# Patient Record
Sex: FEMALE | Race: WHITE | Employment: PART TIME | URBAN - NONMETROPOLITAN AREA
[De-identification: names, ages, dates, MRNs, and addresses within clinical notes are randomized per-mention and may not be internally consistent; named-entity substitution may affect disease eponyms.]

---

## 2018-03-22 ENCOUNTER — HOSPITAL ENCOUNTER (OUTPATIENT)
Age: 33
Setting detail: SPECIMEN
Discharge: HOME | End: 2018-03-22
Payer: MEDICAID

## 2018-03-22 ENCOUNTER — INITIAL PRENATAL (OUTPATIENT)
Dept: OBGYN | Age: 33
End: 2018-03-22
Payer: MEDICAID

## 2018-03-22 ENCOUNTER — ROUTINE PRENATAL (OUTPATIENT)
Dept: OBGYN | Age: 33
End: 2018-03-22
Payer: MEDICAID

## 2018-03-22 VITALS — DIASTOLIC BLOOD PRESSURE: 72 MMHG | BODY MASS INDEX: 22.71 KG/M2 | SYSTOLIC BLOOD PRESSURE: 100 MMHG | WEIGHT: 145 LBS

## 2018-03-22 DIAGNOSIS — N91.2 AMENORRHEA: ICD-10-CM

## 2018-03-22 DIAGNOSIS — O09.30 LATE PRENATAL CARE: ICD-10-CM

## 2018-03-22 DIAGNOSIS — D53.1 MEGALOBLASTIC ANEMIA DUE TO VITAMIN B12 DEFICIENCY: ICD-10-CM

## 2018-03-22 DIAGNOSIS — Z32.01 POSITIVE PREGNANCY TEST: ICD-10-CM

## 2018-03-22 DIAGNOSIS — Z3A.24 24 WEEKS GESTATION OF PREGNANCY: ICD-10-CM

## 2018-03-22 DIAGNOSIS — Z3A.24 24 WEEKS GESTATION OF PREGNANCY: Primary | ICD-10-CM

## 2018-03-22 DIAGNOSIS — O09.32 INSUFFICIENT PRENATAL CARE IN SECOND TRIMESTER: ICD-10-CM

## 2018-03-22 DIAGNOSIS — Z36.87 UNSURE OF LMP (LAST MENSTRUAL PERIOD) AS REASON FOR ULTRASOUND SCAN: ICD-10-CM

## 2018-03-22 DIAGNOSIS — N91.2 AMENORRHEA: Primary | ICD-10-CM

## 2018-03-22 LAB
ABDOMINAL CIRCUMFERENCE: 19.5 CM
ABO/RH: NORMAL
AMPHETAMINE SCREEN URINE: NEGATIVE
ANTIBODY SCREEN: NEGATIVE
BARBITURATE SCREEN URINE: NEGATIVE
BENZODIAZEPINE SCREEN, URINE: NEGATIVE
BIPARIETAL DIAMETER: 5.75 CM
BUPRENORPHINE URINE: NEGATIVE
CANNABINOID SCREEN URINE: NEGATIVE
COCAINE METABOLITE, URINE: NEGATIVE
ESTIMATED FETAL WEIGHT: 674 GRAMS
FEMORAL DIAMETER: NORMAL CM
HC/AC: 1.12
HEAD CIRCUMFERENCE: 21.9 CM
MDMA URINE: NORMAL
METHADONE SCREEN, URINE: NEGATIVE
METHAMPHETAMINE, URINE: NEGATIVE
OPIATES, URINE: NEGATIVE
OXYCODONE SCREEN URINE: NEGATIVE
PHENCYCLIDINE, URINE: NEGATIVE
PROPOXYPHENE, URINE: NEGATIVE
TEST INFORMATION: NORMAL
TRICYCLIC ANTIDEPRESSANTS, UR: NEGATIVE
TSH SERPL DL<=0.05 MIU/L-ACNC: 1.16 MIU/L (ref 0.3–5)
VITAMIN B-12: <150 PG/ML (ref 211–946)

## 2018-03-22 PROCEDURE — 86901 BLOOD TYPING SEROLOGIC RH(D): CPT

## 2018-03-22 PROCEDURE — 87491 CHLMYD TRACH DNA AMP PROBE: CPT

## 2018-03-22 PROCEDURE — 99213 OFFICE O/P EST LOW 20 MIN: CPT | Performed by: ADVANCED PRACTICE MIDWIFE

## 2018-03-22 PROCEDURE — 87591 N.GONORRHOEAE DNA AMP PROB: CPT

## 2018-03-22 PROCEDURE — 36415 COLL VENOUS BLD VENIPUNCTURE: CPT | Performed by: ADVANCED PRACTICE MIDWIFE

## 2018-03-22 PROCEDURE — 85025 COMPLETE CBC W/AUTO DIFF WBC: CPT

## 2018-03-22 PROCEDURE — 87340 HEPATITIS B SURFACE AG IA: CPT

## 2018-03-22 PROCEDURE — 84443 ASSAY THYROID STIM HORMONE: CPT

## 2018-03-22 PROCEDURE — 86780 TREPONEMA PALLIDUM: CPT

## 2018-03-22 PROCEDURE — 86900 BLOOD TYPING SEROLOGIC ABO: CPT

## 2018-03-22 PROCEDURE — 80306 DRUG TEST PRSMV INSTRMNT: CPT

## 2018-03-22 PROCEDURE — 87086 URINE CULTURE/COLONY COUNT: CPT

## 2018-03-22 PROCEDURE — 82607 VITAMIN B-12: CPT

## 2018-03-22 PROCEDURE — 86762 RUBELLA ANTIBODY: CPT

## 2018-03-22 PROCEDURE — 87389 HIV-1 AG W/HIV-1&-2 AB AG IA: CPT

## 2018-03-22 PROCEDURE — 76805 OB US >/= 14 WKS SNGL FETUS: CPT | Performed by: OBSTETRICS & GYNECOLOGY

## 2018-03-22 PROCEDURE — 86850 RBC ANTIBODY SCREEN: CPT

## 2018-03-22 PROCEDURE — 86803 HEPATITIS C AB TEST: CPT

## 2018-03-22 RX ORDER — MULTIVITAMIN/MULTIMINERAL SUPPLEMENT 3080; 920; 120; 400; 22; 1.84; 3; 20; 10; 1; 12; 200; 29; 25; 2 [IU]/1; [IU]/1; MG/1; [IU]/1; [IU]/1; MG/1; MG/1; MG/1; MG/1; MG/1; UG/1; MG/1; MG/1; MG/1; MG/1
1 TABLET, FILM COATED ORAL DAILY
Qty: 90 TABLET | Refills: 5 | Status: SHIPPED | OUTPATIENT
Start: 2018-03-22

## 2018-03-22 RX ORDER — CYANOCOBALAMIN 1000 UG/ML
1000 INJECTION INTRAMUSCULAR; SUBCUTANEOUS ONCE
Status: ON HOLD | COMMUNITY
End: 2018-07-01 | Stop reason: HOSPADM

## 2018-03-22 ASSESSMENT — PATIENT HEALTH QUESTIONNAIRE - PHQ9
SUM OF ALL RESPONSES TO PHQ QUESTIONS 1-9: 0
SUM OF ALL RESPONSES TO PHQ9 QUESTIONS 1 & 2: 0
2. FEELING DOWN, DEPRESSED OR HOPELESS: 0
1. LITTLE INTEREST OR PLEASURE IN DOING THINGS: 0

## 2018-03-22 NOTE — PROGRESS NOTES
Urine    POCT urine pregnancy   2. 15 weeks gestation of pregnancy  Hepatitis C Antibody    HIV Screen    Prenatal type and screen    PRENATAL PROFILE I    Urine culture clean catch    Urine Drug Screen, Comprehensive    C.trachomatis N.gonorrhoeae DNA, Urine    POCT urine pregnancy       Plan: Order Routine Prenatal Lab Yes     Order additional testing if requested         Order Prenatal Vitamins   Yes          Appointment with Aldo Billings CNM in 1 week for  total body exam if indicated      Nurse:   [facility]

## 2018-03-22 NOTE — PROGRESS NOTES
was positive. Yes PT denies fever, chills, nausea and vomiting       Abdomen: enlarged, soft     See prenatal vital sign section and fetal assessment section    ASSESSMENT & Plan   1. Amenorrhea  Hepatitis C Antibody    HIV Screen    Prenatal type and screen    PRENATAL PROFILE I    Urine culture clean catch    Urine Drug Screen, Comprehensive    C.trachomatis N.gonorrhoeae DNA, Urine   2. Unsure of LMP (last menstrual period) as reason for ultrasound scan     3. Positive pregnancy test  Hepatitis C Antibody    HIV Screen    Prenatal type and screen    PRENATAL PROFILE I    Urine culture clean catch    Urine Drug Screen, Comprehensive    C.trachomatis N.gonorrhoeae DNA, Urine    Prenatal Vit-Fe Fumarate-FA (PRENATAL PLUS/IRON) 27-1 MG TABS tablet   4. 24 weeks gestation of pregnancy  TSH With Reflex Ft4    Vitamin B12   5. Megaloblastic anemia due to vitamin B12 deficiency  TSH With Reflex Ft4    Vitamin B12   6. Insufficient prenatal care in second trimester       24.2 wk IUP   CL- 4 cm   bpm  Anterior gr 2-3 placenta, breech presentation  Ovaries WNL  Gender female  Active fetal Movement  All visualized fetal anatomy WNL  LPNC        I have discontinued Ms. Duran's norgestimate-ethinyl estradiol. I am also having her maintain her cyanocobalamin and PRENATAL PLUS/IRON. Return in about 3 weeks (around 4/12/2018) for OB GTT. There are no Patient Instructions on file for this visit.           Sandy Cespedes,3/22/2018 4:26 PM

## 2018-03-23 LAB
ABSOLUTE EOS #: 0.14 K/UL (ref 0–0.44)
ABSOLUTE IMMATURE GRANULOCYTE: 0.13 K/UL (ref 0–0.3)
ABSOLUTE LYMPH #: 1.44 K/UL (ref 1.1–3.7)
ABSOLUTE MONO #: 0.76 K/UL (ref 0.1–1.2)
BASOPHILS # BLD: 0 % (ref 0–2)
BASOPHILS ABSOLUTE: <0.03 K/UL (ref 0–0.2)
CULTURE: NORMAL
CULTURE: NORMAL
DIFFERENTIAL TYPE: ABNORMAL
EOSINOPHILS RELATIVE PERCENT: 2 % (ref 1–4)
HCT VFR BLD CALC: 27.5 % (ref 36.3–47.1)
HEMOGLOBIN: 8.5 G/DL (ref 11.9–15.1)
HEPATITIS B SURFACE ANTIGEN: NONREACTIVE
HEPATITIS C ANTIBODY: NONREACTIVE
HIV AG/AB: NONREACTIVE
IMMATURE GRANULOCYTES: 2 %
LYMPHOCYTES # BLD: 18 % (ref 24–43)
Lab: NORMAL
Lab: NORMAL
MCH RBC QN AUTO: 26.6 PG (ref 25.2–33.5)
MCHC RBC AUTO-ENTMCNC: 30.9 G/DL (ref 28.4–34.8)
MCV RBC AUTO: 86.2 FL (ref 82.6–102.9)
MONOCYTES # BLD: 9 % (ref 3–12)
NRBC AUTOMATED: 0 PER 100 WBC
PDW BLD-RTO: 14.8 % (ref 11.8–14.4)
PLATELET # BLD: 195 K/UL (ref 138–453)
PLATELET ESTIMATE: ABNORMAL
PMV BLD AUTO: 11.3 FL (ref 8.1–13.5)
RBC # BLD: 3.19 M/UL (ref 3.95–5.11)
RBC # BLD: ABNORMAL 10*6/UL
RUBV IGG SER QL: 88.1 IU/ML
SEG NEUTROPHILS: 70 % (ref 36–65)
SEGMENTED NEUTROPHILS ABSOLUTE COUNT: 5.67 K/UL (ref 1.5–8.1)
SPECIMEN DESCRIPTION: NORMAL
SPECIMEN DESCRIPTION: NORMAL
STATUS: NORMAL
T. PALLIDUM, IGG: NONREACTIVE
WBC # BLD: 8.2 K/UL (ref 3.5–11.3)
WBC # BLD: ABNORMAL 10*3/UL

## 2018-03-26 LAB
C. TRACHOMATIS DNA ,URINE: NEGATIVE
N. GONORRHOEAE DNA, URINE: NEGATIVE

## 2018-04-16 ENCOUNTER — ROUTINE PRENATAL (OUTPATIENT)
Dept: OBGYN | Age: 33
End: 2018-04-16
Payer: MEDICAID

## 2018-04-16 VITALS — BODY MASS INDEX: 23.49 KG/M2 | SYSTOLIC BLOOD PRESSURE: 124 MMHG | WEIGHT: 150 LBS | DIASTOLIC BLOOD PRESSURE: 70 MMHG

## 2018-04-16 DIAGNOSIS — D53.1 MEGALOBLASTIC ANEMIA DUE TO VITAMIN B12 DEFICIENCY: ICD-10-CM

## 2018-04-16 DIAGNOSIS — Z3A.27 27 WEEKS GESTATION OF PREGNANCY: ICD-10-CM

## 2018-04-16 DIAGNOSIS — O09.893 SUPERVISION OF OTHER HIGH RISK PREGNANCIES, THIRD TRIMESTER: Primary | ICD-10-CM

## 2018-04-16 LAB
GLUCOSE BLD-MCNC: 132 MG/DL
HGB, POC: 8.6

## 2018-04-16 PROCEDURE — 99213 OFFICE O/P EST LOW 20 MIN: CPT | Performed by: ADVANCED PRACTICE MIDWIFE

## 2018-04-16 PROCEDURE — 85018 HEMOGLOBIN: CPT | Performed by: ADVANCED PRACTICE MIDWIFE

## 2018-04-16 PROCEDURE — 4004F PT TOBACCO SCREEN RCVD TLK: CPT | Performed by: ADVANCED PRACTICE MIDWIFE

## 2018-04-16 PROCEDURE — 82962 GLUCOSE BLOOD TEST: CPT | Performed by: ADVANCED PRACTICE MIDWIFE

## 2018-04-16 PROCEDURE — G8427 DOCREV CUR MEDS BY ELIG CLIN: HCPCS | Performed by: ADVANCED PRACTICE MIDWIFE

## 2018-04-16 PROCEDURE — G8420 CALC BMI NORM PARAMETERS: HCPCS | Performed by: ADVANCED PRACTICE MIDWIFE

## 2018-05-10 ENCOUNTER — ROUTINE PRENATAL (OUTPATIENT)
Dept: OBGYN | Age: 33
End: 2018-05-10
Payer: MEDICAID

## 2018-05-10 VITALS — SYSTOLIC BLOOD PRESSURE: 122 MMHG | DIASTOLIC BLOOD PRESSURE: 62 MMHG | BODY MASS INDEX: 23.84 KG/M2 | WEIGHT: 152.2 LBS

## 2018-05-10 DIAGNOSIS — Z3A.31 31 WEEKS GESTATION OF PREGNANCY: Primary | ICD-10-CM

## 2018-05-10 DIAGNOSIS — D53.1 MEGALOBLASTIC ANEMIA DUE TO VITAMIN B12 DEFICIENCY: ICD-10-CM

## 2018-05-10 DIAGNOSIS — O09.893 SUPERVISION OF OTHER HIGH RISK PREGNANCIES, THIRD TRIMESTER: ICD-10-CM

## 2018-05-10 DIAGNOSIS — O09.30 LATE PRENATAL CARE: ICD-10-CM

## 2018-05-10 PROCEDURE — 99212 OFFICE O/P EST SF 10 MIN: CPT | Performed by: ADVANCED PRACTICE MIDWIFE

## 2018-05-23 ENCOUNTER — ROUTINE PRENATAL (OUTPATIENT)
Dept: OBGYN | Age: 33
End: 2018-05-23
Payer: MEDICAID

## 2018-05-23 VITALS — DIASTOLIC BLOOD PRESSURE: 76 MMHG | WEIGHT: 152.6 LBS | BODY MASS INDEX: 23.9 KG/M2 | SYSTOLIC BLOOD PRESSURE: 116 MMHG

## 2018-05-23 DIAGNOSIS — O09.893 SUPERVISION OF OTHER HIGH RISK PREGNANCIES, THIRD TRIMESTER: Primary | ICD-10-CM

## 2018-05-23 DIAGNOSIS — D53.1 MEGALOBLASTIC ANEMIA DUE TO VITAMIN B12 DEFICIENCY: ICD-10-CM

## 2018-05-23 DIAGNOSIS — Z3A.33 33 WEEKS GESTATION OF PREGNANCY: ICD-10-CM

## 2018-05-23 LAB
ABDOMINAL CIRCUMFERENCE: 29.15 CM
BIPARIETAL DIAMETER: 8.15 CM
ESTIMATED FETAL WEIGHT: 2138 GRAMS
FEMORAL DIAMETER: NORMAL CM
HC/AC: 1.07
HEAD CIRCUMFERENCE: 31.12 CM

## 2018-05-23 PROCEDURE — 99212 OFFICE O/P EST SF 10 MIN: CPT | Performed by: OBSTETRICS & GYNECOLOGY

## 2018-05-23 PROCEDURE — 76816 OB US FOLLOW-UP PER FETUS: CPT | Performed by: OBSTETRICS & GYNECOLOGY

## 2018-05-29 ENCOUNTER — ROUTINE PRENATAL (OUTPATIENT)
Dept: OBGYN | Age: 33
End: 2018-05-29
Payer: MEDICAID

## 2018-05-29 VITALS — DIASTOLIC BLOOD PRESSURE: 70 MMHG | SYSTOLIC BLOOD PRESSURE: 118 MMHG | BODY MASS INDEX: 24.28 KG/M2 | WEIGHT: 155 LBS

## 2018-05-29 DIAGNOSIS — O09.30 LATE PRENATAL CARE: ICD-10-CM

## 2018-05-29 DIAGNOSIS — D53.1 MEGALOBLASTIC ANEMIA DUE TO VITAMIN B12 DEFICIENCY: Primary | ICD-10-CM

## 2018-05-29 DIAGNOSIS — Z3A.34 34 WEEKS GESTATION OF PREGNANCY: ICD-10-CM

## 2018-05-29 PROCEDURE — 99213 OFFICE O/P EST LOW 20 MIN: CPT | Performed by: ADVANCED PRACTICE MIDWIFE

## 2018-05-29 PROCEDURE — G8427 DOCREV CUR MEDS BY ELIG CLIN: HCPCS | Performed by: ADVANCED PRACTICE MIDWIFE

## 2018-05-29 PROCEDURE — 59025 FETAL NON-STRESS TEST: CPT | Performed by: ADVANCED PRACTICE MIDWIFE

## 2018-05-29 PROCEDURE — G8420 CALC BMI NORM PARAMETERS: HCPCS | Performed by: ADVANCED PRACTICE MIDWIFE

## 2018-05-29 PROCEDURE — 4004F PT TOBACCO SCREEN RCVD TLK: CPT | Performed by: ADVANCED PRACTICE MIDWIFE

## 2018-06-05 ENCOUNTER — ROUTINE PRENATAL (OUTPATIENT)
Dept: OBGYN | Age: 33
End: 2018-06-05
Payer: MEDICAID

## 2018-06-05 VITALS — DIASTOLIC BLOOD PRESSURE: 64 MMHG | BODY MASS INDEX: 24.53 KG/M2 | SYSTOLIC BLOOD PRESSURE: 114 MMHG | WEIGHT: 156.6 LBS

## 2018-06-05 DIAGNOSIS — O09.30 LATE PRENATAL CARE: ICD-10-CM

## 2018-06-05 DIAGNOSIS — D53.1 MEGALOBLASTIC ANEMIA DUE TO VITAMIN B12 DEFICIENCY: Primary | ICD-10-CM

## 2018-06-05 DIAGNOSIS — Z3A.35 35 WEEKS GESTATION OF PREGNANCY: ICD-10-CM

## 2018-06-05 PROCEDURE — 99212 OFFICE O/P EST SF 10 MIN: CPT | Performed by: ADVANCED PRACTICE MIDWIFE

## 2018-06-05 PROCEDURE — 76819 FETAL BIOPHYS PROFIL W/O NST: CPT | Performed by: OBSTETRICS & GYNECOLOGY

## 2018-06-11 ENCOUNTER — HOSPITAL ENCOUNTER (OUTPATIENT)
Age: 33
Setting detail: SPECIMEN
Discharge: HOME | End: 2018-06-11
Payer: MEDICAID

## 2018-06-11 ENCOUNTER — ROUTINE PRENATAL (OUTPATIENT)
Dept: OBGYN | Age: 33
End: 2018-06-11
Payer: MEDICAID

## 2018-06-11 VITALS — DIASTOLIC BLOOD PRESSURE: 82 MMHG | SYSTOLIC BLOOD PRESSURE: 130 MMHG | BODY MASS INDEX: 24.06 KG/M2 | WEIGHT: 153.6 LBS

## 2018-06-11 DIAGNOSIS — D53.1 MEGALOBLASTIC ANEMIA DUE TO VITAMIN B12 DEFICIENCY: Primary | ICD-10-CM

## 2018-06-11 DIAGNOSIS — Z3A.35 35 WEEKS GESTATION OF PREGNANCY: ICD-10-CM

## 2018-06-11 DIAGNOSIS — O09.30 LATE PRENATAL CARE: ICD-10-CM

## 2018-06-11 PROCEDURE — G8427 DOCREV CUR MEDS BY ELIG CLIN: HCPCS | Performed by: ADVANCED PRACTICE MIDWIFE

## 2018-06-11 PROCEDURE — 87081 CULTURE SCREEN ONLY: CPT

## 2018-06-11 PROCEDURE — 76819 FETAL BIOPHYS PROFIL W/O NST: CPT | Performed by: OBSTETRICS & GYNECOLOGY

## 2018-06-11 PROCEDURE — G8420 CALC BMI NORM PARAMETERS: HCPCS | Performed by: ADVANCED PRACTICE MIDWIFE

## 2018-06-11 PROCEDURE — 4004F PT TOBACCO SCREEN RCVD TLK: CPT | Performed by: ADVANCED PRACTICE MIDWIFE

## 2018-06-11 PROCEDURE — 99213 OFFICE O/P EST LOW 20 MIN: CPT | Performed by: ADVANCED PRACTICE MIDWIFE

## 2018-06-14 ENCOUNTER — ROUTINE PRENATAL (OUTPATIENT)
Dept: OBGYN | Age: 33
End: 2018-06-14
Payer: MEDICAID

## 2018-06-14 VITALS — SYSTOLIC BLOOD PRESSURE: 116 MMHG | DIASTOLIC BLOOD PRESSURE: 70 MMHG | WEIGHT: 154.2 LBS | BODY MASS INDEX: 24.15 KG/M2

## 2018-06-14 DIAGNOSIS — Z3A.36 36 WEEKS GESTATION OF PREGNANCY: Primary | ICD-10-CM

## 2018-06-14 DIAGNOSIS — D53.1 MEGALOBLASTIC ANEMIA DUE TO VITAMIN B12 DEFICIENCY: ICD-10-CM

## 2018-06-14 PROCEDURE — 99213 OFFICE O/P EST LOW 20 MIN: CPT | Performed by: ADVANCED PRACTICE MIDWIFE

## 2018-06-14 PROCEDURE — G8420 CALC BMI NORM PARAMETERS: HCPCS | Performed by: ADVANCED PRACTICE MIDWIFE

## 2018-06-14 PROCEDURE — 59025 FETAL NON-STRESS TEST: CPT | Performed by: ADVANCED PRACTICE MIDWIFE

## 2018-06-14 PROCEDURE — 4004F PT TOBACCO SCREEN RCVD TLK: CPT | Performed by: ADVANCED PRACTICE MIDWIFE

## 2018-06-14 PROCEDURE — G8427 DOCREV CUR MEDS BY ELIG CLIN: HCPCS | Performed by: ADVANCED PRACTICE MIDWIFE

## 2018-06-15 LAB
CULTURE: NORMAL
CULTURE: NORMAL
Lab: NORMAL
SPECIMEN DESCRIPTION: NORMAL
SPECIMEN DESCRIPTION: NORMAL
STATUS: NORMAL

## 2018-06-18 ENCOUNTER — ROUTINE PRENATAL (OUTPATIENT)
Dept: OBGYN | Age: 33
End: 2018-06-18
Payer: MEDICAID

## 2018-06-18 VITALS — SYSTOLIC BLOOD PRESSURE: 104 MMHG | BODY MASS INDEX: 24.68 KG/M2 | DIASTOLIC BLOOD PRESSURE: 60 MMHG | WEIGHT: 157.6 LBS

## 2018-06-18 DIAGNOSIS — O09.893 SUPERVISION OF OTHER HIGH RISK PREGNANCIES, THIRD TRIMESTER: ICD-10-CM

## 2018-06-18 DIAGNOSIS — Z3A.36 36 WEEKS GESTATION OF PREGNANCY: ICD-10-CM

## 2018-06-18 DIAGNOSIS — D53.1 MEGALOBLASTIC ANEMIA DUE TO VITAMIN B12 DEFICIENCY: Primary | ICD-10-CM

## 2018-06-18 PROCEDURE — 4004F PT TOBACCO SCREEN RCVD TLK: CPT | Performed by: ADVANCED PRACTICE MIDWIFE

## 2018-06-18 PROCEDURE — 76819 FETAL BIOPHYS PROFIL W/O NST: CPT | Performed by: OBSTETRICS & GYNECOLOGY

## 2018-06-18 PROCEDURE — G8420 CALC BMI NORM PARAMETERS: HCPCS | Performed by: ADVANCED PRACTICE MIDWIFE

## 2018-06-18 PROCEDURE — G8427 DOCREV CUR MEDS BY ELIG CLIN: HCPCS | Performed by: ADVANCED PRACTICE MIDWIFE

## 2018-06-18 PROCEDURE — 99213 OFFICE O/P EST LOW 20 MIN: CPT | Performed by: ADVANCED PRACTICE MIDWIFE

## 2018-06-21 ENCOUNTER — ROUTINE PRENATAL (OUTPATIENT)
Dept: OBGYN | Age: 33
End: 2018-06-21
Payer: MEDICAID

## 2018-06-21 VITALS — DIASTOLIC BLOOD PRESSURE: 72 MMHG | WEIGHT: 156 LBS | BODY MASS INDEX: 24.43 KG/M2 | SYSTOLIC BLOOD PRESSURE: 128 MMHG

## 2018-06-21 DIAGNOSIS — Z3A.37 37 WEEKS GESTATION OF PREGNANCY: ICD-10-CM

## 2018-06-21 DIAGNOSIS — D53.1 MEGALOBLASTIC ANEMIA DUE TO VITAMIN B12 DEFICIENCY: Primary | ICD-10-CM

## 2018-06-21 PROCEDURE — 4004F PT TOBACCO SCREEN RCVD TLK: CPT | Performed by: ADVANCED PRACTICE MIDWIFE

## 2018-06-21 PROCEDURE — 99213 OFFICE O/P EST LOW 20 MIN: CPT | Performed by: ADVANCED PRACTICE MIDWIFE

## 2018-06-21 PROCEDURE — G8427 DOCREV CUR MEDS BY ELIG CLIN: HCPCS | Performed by: ADVANCED PRACTICE MIDWIFE

## 2018-06-21 PROCEDURE — 59025 FETAL NON-STRESS TEST: CPT | Performed by: ADVANCED PRACTICE MIDWIFE

## 2018-06-21 PROCEDURE — G8420 CALC BMI NORM PARAMETERS: HCPCS | Performed by: ADVANCED PRACTICE MIDWIFE

## 2018-06-25 ENCOUNTER — ROUTINE PRENATAL (OUTPATIENT)
Dept: OBGYN | Age: 33
End: 2018-06-25
Payer: MEDICAID

## 2018-06-25 VITALS — SYSTOLIC BLOOD PRESSURE: 120 MMHG | BODY MASS INDEX: 23.84 KG/M2 | DIASTOLIC BLOOD PRESSURE: 62 MMHG | WEIGHT: 152.2 LBS

## 2018-06-25 DIAGNOSIS — Z3A.37 37 WEEKS GESTATION OF PREGNANCY: ICD-10-CM

## 2018-06-25 DIAGNOSIS — O09.893 SUPERVISION OF OTHER HIGH RISK PREGNANCIES, THIRD TRIMESTER: ICD-10-CM

## 2018-06-25 DIAGNOSIS — D53.1 MEGALOBLASTIC ANEMIA DUE TO VITAMIN B12 DEFICIENCY: Primary | ICD-10-CM

## 2018-06-25 PROCEDURE — G8427 DOCREV CUR MEDS BY ELIG CLIN: HCPCS | Performed by: ADVANCED PRACTICE MIDWIFE

## 2018-06-25 PROCEDURE — 4004F PT TOBACCO SCREEN RCVD TLK: CPT | Performed by: ADVANCED PRACTICE MIDWIFE

## 2018-06-25 PROCEDURE — G8420 CALC BMI NORM PARAMETERS: HCPCS | Performed by: ADVANCED PRACTICE MIDWIFE

## 2018-06-25 PROCEDURE — 99213 OFFICE O/P EST LOW 20 MIN: CPT | Performed by: ADVANCED PRACTICE MIDWIFE

## 2018-06-25 PROCEDURE — 76819 FETAL BIOPHYS PROFIL W/O NST: CPT | Performed by: OBSTETRICS & GYNECOLOGY

## 2018-06-28 ENCOUNTER — ROUTINE PRENATAL (OUTPATIENT)
Dept: OBGYN | Age: 33
End: 2018-06-28
Payer: MEDICAID

## 2018-06-28 VITALS — WEIGHT: 152.8 LBS | DIASTOLIC BLOOD PRESSURE: 60 MMHG | SYSTOLIC BLOOD PRESSURE: 118 MMHG | BODY MASS INDEX: 23.93 KG/M2

## 2018-06-28 DIAGNOSIS — D53.1 MEGALOBLASTIC ANEMIA DUE TO VITAMIN B12 DEFICIENCY: Primary | ICD-10-CM

## 2018-06-28 DIAGNOSIS — O09.893 SUPERVISION OF OTHER HIGH RISK PREGNANCIES, THIRD TRIMESTER: ICD-10-CM

## 2018-06-28 DIAGNOSIS — Z3A.38 38 WEEKS GESTATION OF PREGNANCY: ICD-10-CM

## 2018-06-28 PROCEDURE — 4004F PT TOBACCO SCREEN RCVD TLK: CPT | Performed by: ADVANCED PRACTICE MIDWIFE

## 2018-06-28 PROCEDURE — G8420 CALC BMI NORM PARAMETERS: HCPCS | Performed by: ADVANCED PRACTICE MIDWIFE

## 2018-06-28 PROCEDURE — 99213 OFFICE O/P EST LOW 20 MIN: CPT | Performed by: ADVANCED PRACTICE MIDWIFE

## 2018-06-28 PROCEDURE — 59025 FETAL NON-STRESS TEST: CPT | Performed by: ADVANCED PRACTICE MIDWIFE

## 2018-06-28 PROCEDURE — G8427 DOCREV CUR MEDS BY ELIG CLIN: HCPCS | Performed by: ADVANCED PRACTICE MIDWIFE

## 2018-06-30 ENCOUNTER — HOSPITAL ENCOUNTER (INPATIENT)
Age: 33
LOS: 1 days | Discharge: HOME | DRG: 560 | End: 2018-07-01
Attending: OBSTETRICS & GYNECOLOGY | Admitting: OBSTETRICS & GYNECOLOGY
Payer: MEDICAID

## 2018-06-30 ENCOUNTER — APPOINTMENT (OUTPATIENT)
Dept: ULTRASOUND IMAGING | Age: 33
DRG: 560 | End: 2018-06-30
Payer: MEDICAID

## 2018-06-30 ENCOUNTER — ANESTHESIA EVENT (OUTPATIENT)
Dept: LABOR AND DELIVERY | Age: 33
DRG: 560 | End: 2018-06-30
Payer: MEDICAID

## 2018-06-30 ENCOUNTER — ANESTHESIA (OUTPATIENT)
Dept: LABOR AND DELIVERY | Age: 33
DRG: 560 | End: 2018-06-30
Payer: MEDICAID

## 2018-06-30 PROBLEM — O42.90 AMNIOTIC FLUID LEAKING: Status: ACTIVE | Noted: 2018-06-30

## 2018-06-30 PROBLEM — Z78.9 ADMITTED TO LABOR AND DELIVERY: Status: ACTIVE | Noted: 2018-06-30

## 2018-06-30 LAB
ABSOLUTE EOS #: 0.13 K/UL (ref 0–0.44)
ABSOLUTE IMMATURE GRANULOCYTE: 0.26 K/UL (ref 0–0.3)
ABSOLUTE LYMPH #: 1.45 K/UL (ref 1.1–3.7)
ABSOLUTE MONO #: 0.92 K/UL (ref 0.1–1.2)
AMPHETAMINE SCREEN URINE: NEGATIVE
BARBITURATE SCREEN URINE: NEGATIVE
BASOPHILS # BLD: 1 % (ref 0–2)
BASOPHILS ABSOLUTE: 0.13 K/UL (ref 0–0.2)
BENZODIAZEPINE SCREEN, URINE: NEGATIVE
BILIRUBIN URINE: NEGATIVE
BUPRENORPHINE URINE: NEGATIVE
CANNABINOID SCREEN URINE: NEGATIVE
COCAINE METABOLITE, URINE: NEGATIVE
COLOR: YELLOW
COMMENT UA: NORMAL
DIFFERENTIAL TYPE: ABNORMAL
EOSINOPHILS RELATIVE PERCENT: 1 % (ref 1–4)
GLUCOSE URINE: NEGATIVE
HCT VFR BLD CALC: 30.7 % (ref 36.3–47.1)
HEMOGLOBIN: 9.1 G/DL (ref 11.9–15.1)
IMMATURE GRANULOCYTES: 2 %
KETONES, URINE: NEGATIVE
LEUKOCYTE ESTERASE, URINE: NEGATIVE
LYMPHOCYTES # BLD: 11 % (ref 24–43)
MCH RBC QN AUTO: 23 PG (ref 25.2–33.5)
MCHC RBC AUTO-ENTMCNC: 29.6 G/DL (ref 28.4–34.8)
MCV RBC AUTO: 77.5 FL (ref 82.6–102.9)
MDMA URINE: NORMAL
METHADONE SCREEN, URINE: NEGATIVE
METHAMPHETAMINE, URINE: NEGATIVE
MONOCYTES # BLD: 7 % (ref 3–12)
MORPHOLOGY: NORMAL
NITRITE, URINE: NEGATIVE
NRBC AUTOMATED: 0.2 PER 100 WBC
OPIATES, URINE: NEGATIVE
OXYCODONE SCREEN URINE: NEGATIVE
PDW BLD-RTO: 21.5 % (ref 11.8–14.4)
PH UA: 6 (ref 5–9)
PHENCYCLIDINE, URINE: NEGATIVE
PLATELET # BLD: 216 K/UL (ref 138–453)
PLATELET ESTIMATE: ABNORMAL
PMV BLD AUTO: 11.3 FL (ref 8.1–13.5)
PROPOXYPHENE, URINE: NEGATIVE
PROTEIN UA: NEGATIVE
RBC # BLD: 3.96 M/UL (ref 3.95–5.11)
RBC # BLD: ABNORMAL 10*6/UL
SEG NEUTROPHILS: 78 % (ref 36–65)
SEGMENTED NEUTROPHILS ABSOLUTE COUNT: 10.31 K/UL (ref 1.5–8.1)
SPECIFIC GRAVITY UA: 1.01 (ref 1.01–1.02)
TEST INFORMATION: NORMAL
TRICYCLIC ANTIDEPRESSANTS, UR: NEGATIVE
TURBIDITY: CLEAR
URINE HGB: NEGATIVE
UROBILINOGEN, URINE: NORMAL
WBC # BLD: 13.2 K/UL (ref 3.5–11.3)
WBC # BLD: ABNORMAL 10*3/UL

## 2018-06-30 PROCEDURE — 85025 COMPLETE CBC W/AUTO DIFF WBC: CPT

## 2018-06-30 PROCEDURE — 6360000002 HC RX W HCPCS: Performed by: ADVANCED PRACTICE MIDWIFE

## 2018-06-30 PROCEDURE — 59409 OBSTETRICAL CARE: CPT | Performed by: ADVANCED PRACTICE MIDWIFE

## 2018-06-30 PROCEDURE — 6370000000 HC RX 637 (ALT 250 FOR IP): Performed by: ADVANCED PRACTICE MIDWIFE

## 2018-06-30 PROCEDURE — 2580000003 HC RX 258: Performed by: ADVANCED PRACTICE MIDWIFE

## 2018-06-30 PROCEDURE — 4A1HXCZ MONITORING OF PRODUCTS OF CONCEPTION, CARDIAC RATE, EXTERNAL APPROACH: ICD-10-PCS | Performed by: ADVANCED PRACTICE MIDWIFE

## 2018-06-30 PROCEDURE — 1220000000 HC SEMI PRIVATE OB R&B

## 2018-06-30 PROCEDURE — 2500000003 HC RX 250 WO HCPCS: Performed by: NURSE ANESTHETIST, CERTIFIED REGISTERED

## 2018-06-30 PROCEDURE — 6360000002 HC RX W HCPCS: Performed by: NURSE ANESTHETIST, CERTIFIED REGISTERED

## 2018-06-30 PROCEDURE — 7200000001 HC VAGINAL DELIVERY

## 2018-06-30 PROCEDURE — 81003 URINALYSIS AUTO W/O SCOPE: CPT

## 2018-06-30 PROCEDURE — 36415 COLL VENOUS BLD VENIPUNCTURE: CPT

## 2018-06-30 PROCEDURE — 80306 DRUG TEST PRSMV INSTRMNT: CPT

## 2018-06-30 PROCEDURE — 76805 OB US >/= 14 WKS SNGL FETUS: CPT

## 2018-06-30 RX ORDER — FENTANYL CITRATE 50 UG/ML
INJECTION, SOLUTION INTRAMUSCULAR; INTRAVENOUS PRN
Status: DISCONTINUED | OUTPATIENT
Start: 2018-06-30 | End: 2018-06-30 | Stop reason: SDUPTHER

## 2018-06-30 RX ORDER — ROPIVACAINE HYDROCHLORIDE 2 MG/ML
INJECTION, SOLUTION EPIDURAL; INFILTRATION; PERINEURAL PRN
Status: DISCONTINUED | OUTPATIENT
Start: 2018-06-30 | End: 2018-06-30 | Stop reason: SDUPTHER

## 2018-06-30 RX ORDER — SODIUM CHLORIDE 0.9 % (FLUSH) 0.9 %
10 SYRINGE (ML) INJECTION EVERY 12 HOURS SCHEDULED
Status: DISCONTINUED | OUTPATIENT
Start: 2018-06-30 | End: 2018-07-01 | Stop reason: HOSPADM

## 2018-06-30 RX ORDER — NALOXONE HYDROCHLORIDE 0.4 MG/ML
0.4 INJECTION, SOLUTION INTRAMUSCULAR; INTRAVENOUS; SUBCUTANEOUS PRN
Status: DISCONTINUED | OUTPATIENT
Start: 2018-06-30 | End: 2018-06-30

## 2018-06-30 RX ORDER — SODIUM CHLORIDE 0.9 % (FLUSH) 0.9 %
10 SYRINGE (ML) INJECTION EVERY 12 HOURS SCHEDULED
Status: DISCONTINUED | OUTPATIENT
Start: 2018-06-30 | End: 2018-06-30

## 2018-06-30 RX ORDER — ROPIVACAINE HYDROCHLORIDE 2 MG/ML
INJECTION, SOLUTION EPIDURAL; INFILTRATION; PERINEURAL CONTINUOUS PRN
Status: DISCONTINUED | OUTPATIENT
Start: 2018-06-30 | End: 2018-06-30 | Stop reason: SDUPTHER

## 2018-06-30 RX ORDER — SODIUM CHLORIDE 0.9 % (FLUSH) 0.9 %
10 SYRINGE (ML) INJECTION PRN
Status: DISCONTINUED | OUTPATIENT
Start: 2018-06-30 | End: 2018-07-01 | Stop reason: HOSPADM

## 2018-06-30 RX ORDER — LIDOCAINE HYDROCHLORIDE AND EPINEPHRINE 20; 5 MG/ML; UG/ML
INJECTION, SOLUTION EPIDURAL; INFILTRATION; INTRACAUDAL; PERINEURAL PRN
Status: DISCONTINUED | OUTPATIENT
Start: 2018-06-30 | End: 2018-06-30 | Stop reason: SDUPTHER

## 2018-06-30 RX ORDER — ONDANSETRON 2 MG/ML
4 INJECTION INTRAMUSCULAR; INTRAVENOUS EVERY 6 HOURS PRN
Status: DISCONTINUED | OUTPATIENT
Start: 2018-06-30 | End: 2018-06-30

## 2018-06-30 RX ORDER — NALBUPHINE HCL 10 MG/ML
10 AMPUL (ML) INJECTION
Status: DISCONTINUED | OUTPATIENT
Start: 2018-06-30 | End: 2018-06-30

## 2018-06-30 RX ORDER — SODIUM CHLORIDE 0.9 % (FLUSH) 0.9 %
10 SYRINGE (ML) INJECTION PRN
Status: DISCONTINUED | OUTPATIENT
Start: 2018-06-30 | End: 2018-06-30

## 2018-06-30 RX ORDER — DOCUSATE SODIUM 100 MG/1
100 CAPSULE, LIQUID FILLED ORAL 2 TIMES DAILY PRN
Status: DISCONTINUED | OUTPATIENT
Start: 2018-06-30 | End: 2018-07-01 | Stop reason: HOSPADM

## 2018-06-30 RX ORDER — MISOPROSTOL 100 UG/1
900 TABLET ORAL PRN
Status: DISCONTINUED | OUTPATIENT
Start: 2018-06-30 | End: 2018-06-30

## 2018-06-30 RX ORDER — IBUPROFEN 800 MG/1
800 TABLET ORAL EVERY 8 HOURS
Status: DISCONTINUED | OUTPATIENT
Start: 2018-06-30 | End: 2018-07-01 | Stop reason: HOSPADM

## 2018-06-30 RX ORDER — LIDOCAINE HYDROCHLORIDE 10 MG/ML
30 INJECTION, SOLUTION EPIDURAL; INFILTRATION; INTRACAUDAL; PERINEURAL PRN
Status: DISCONTINUED | OUTPATIENT
Start: 2018-06-30 | End: 2018-06-30

## 2018-06-30 RX ORDER — SODIUM CHLORIDE, SODIUM LACTATE, POTASSIUM CHLORIDE, CALCIUM CHLORIDE 600; 310; 30; 20 MG/100ML; MG/100ML; MG/100ML; MG/100ML
INJECTION, SOLUTION INTRAVENOUS CONTINUOUS
Status: DISCONTINUED | OUTPATIENT
Start: 2018-06-30 | End: 2018-06-30

## 2018-06-30 RX ORDER — LANOLIN 100 %
OINTMENT (GRAM) TOPICAL PRN
Status: DISCONTINUED | OUTPATIENT
Start: 2018-06-30 | End: 2018-07-01 | Stop reason: HOSPADM

## 2018-06-30 RX ORDER — CARBOPROST TROMETHAMINE 250 UG/ML
250 INJECTION, SOLUTION INTRAMUSCULAR PRN
Status: DISCONTINUED | OUTPATIENT
Start: 2018-06-30 | End: 2018-06-30

## 2018-06-30 RX ORDER — ACETAMINOPHEN 325 MG/1
650 TABLET ORAL EVERY 4 HOURS PRN
Status: DISCONTINUED | OUTPATIENT
Start: 2018-06-30 | End: 2018-06-30

## 2018-06-30 RX ORDER — LIDOCAINE HYDROCHLORIDE 20 MG/ML
INJECTION, SOLUTION INFILTRATION; PERINEURAL PRN
Status: DISCONTINUED | OUTPATIENT
Start: 2018-06-30 | End: 2018-06-30 | Stop reason: SDUPTHER

## 2018-06-30 RX ORDER — ACETAMINOPHEN 325 MG/1
650 TABLET ORAL EVERY 4 HOURS PRN
Status: DISCONTINUED | OUTPATIENT
Start: 2018-06-30 | End: 2018-07-01 | Stop reason: HOSPADM

## 2018-06-30 RX ORDER — METHYLERGONOVINE MALEATE 0.2 MG/ML
200 INJECTION INTRAVENOUS PRN
Status: DISCONTINUED | OUTPATIENT
Start: 2018-06-30 | End: 2018-06-30

## 2018-06-30 RX ADMIN — LIDOCAINE HYDROCHLORIDE,EPINEPHRINE BITARTRATE 2 ML: 20; .005 INJECTION, SOLUTION EPIDURAL; INFILTRATION; INTRACAUDAL; PERINEURAL at 16:36

## 2018-06-30 RX ADMIN — ONDANSETRON 4 MG: 2 INJECTION, SOLUTION INTRAMUSCULAR; INTRAVENOUS at 16:51

## 2018-06-30 RX ADMIN — LIDOCAINE HYDROCHLORIDE 3 ML: 20 INJECTION, SOLUTION INFILTRATION; PERINEURAL at 16:32

## 2018-06-30 RX ADMIN — ROPIVACAINE HYDROCHLORIDE 5 ML: 2 INJECTION, SOLUTION EPIDURAL; INFILTRATION at 16:40

## 2018-06-30 RX ADMIN — FENTANYL CITRATE 100 MCG: 50 INJECTION INTRAMUSCULAR; INTRAVENOUS at 16:52

## 2018-06-30 RX ADMIN — IBUPROFEN 800 MG: 800 TABLET ORAL at 22:58

## 2018-06-30 RX ADMIN — Medication 1 MILLI-UNITS/MIN: at 14:48

## 2018-06-30 RX ADMIN — LIDOCAINE HYDROCHLORIDE 5 ML: 20 INJECTION, SOLUTION INFILTRATION; PERINEURAL at 16:51

## 2018-06-30 RX ADMIN — SODIUM CHLORIDE, POTASSIUM CHLORIDE, SODIUM LACTATE AND CALCIUM CHLORIDE: 600; 310; 30; 20 INJECTION, SOLUTION INTRAVENOUS at 16:43

## 2018-06-30 RX ADMIN — ROPIVACAINE HYDROCHLORIDE 12 ML/HR: 2 INJECTION, SOLUTION EPIDURAL; INFILTRATION at 16:36

## 2018-06-30 RX ADMIN — LIDOCAINE HYDROCHLORIDE,EPINEPHRINE BITARTRATE 3 ML: 20; .005 INJECTION, SOLUTION EPIDURAL; INFILTRATION; INTRACAUDAL; PERINEURAL at 16:34

## 2018-06-30 RX ADMIN — SODIUM CHLORIDE, POTASSIUM CHLORIDE, SODIUM LACTATE AND CALCIUM CHLORIDE: 600; 310; 30; 20 INJECTION, SOLUTION INTRAVENOUS at 14:49

## 2018-06-30 ASSESSMENT — PAIN SCALES - GENERAL: PAINLEVEL_OUTOF10: 4

## 2018-06-30 ASSESSMENT — LIFESTYLE VARIABLES: SMOKING_STATUS: 1

## 2018-06-30 NOTE — L&D DELIVERY NOTE
remain bonding in delivery room      Details of Procedure: The patient is a 27 y.o. female at 38w3d   OB History      Para Term  AB Living    4 3 3     3    SAB TAB Ectopic Molar Multiple Live Births              3       who was admitted for SROM. She received the following interventions: IV Pitocin augmentation She was known to be GBS negative and did not receive antibiotic prophylaxis. The patient progressed well,did receive an epidural, became complete and started to push. After pushing for 3 contractions the fetal head was at the perineum,  the rest of the infant delivered atraumatically, placed on mother abdomen. Nucal Cord was not noted. Cord was clamped and cut per friend of the mother . The delivery of the placenta was spontaneous. Placenta was inspectedintact. The perineum and vagina were explored and no laceration.

## 2018-06-30 NOTE — FLOWSHEET NOTE
This RN in room at this time to monitor EFM. Variable decel noted FHR to 90 with gradual return to baseline after approx 60 seconds.

## 2018-06-30 NOTE — ANESTHESIA PRE PROCEDURE
900 mcg Rectal PRN Ivonne Ducking, APRN - CNM        witch hazel-glycerin (TUCKS) pad   Topical PRN Ivonne Ducking, APRN - CNM        benzocaine-menthol (DERMOPLAST) 20-0.5 % spray   Topical PRN Ivonne Ducking, APRN - CNM        naloxone [facility]) injection 0.4 mg  0.4 mg Intravenous PRN Trina Alvarez MD         Facility-Administered Medications Ordered in Other Encounters   Medication Dose Route Frequency Provider Last Rate Last Dose    fentaNYL (SUBLIMAZE) injection    PRN Arlyss [place], APRN - CRNA   100 mcg at 06/30/18 1652    ropivacaine (NAROPIN) 0.2% injection 0.2%    Continuous PRN Arlyss [place], APRN - CRNA 12 mL/hr at 06/30/18 1636 12 mL/hr at 06/30/18 1636    ropivacaine (NAROPIN) 0.2% injection 0.2%    PRN Arlyss [place], APRN - CRNA   5 mL at 06/30/18 1640    lidocaine-EPINEPHrine 2 percent-1:[id number] injection    PRN Arlyss [place], APRN - CRNA   2 mL at 06/30/18 1636    lidocaine 2 % injection    PRN Arlyss [place], APRN - CRNA   5 mL at 06/30/18 1651       Allergies:  No Known Allergies    Problem List:    Patient Active Problem List   Diagnosis Code    Anemia D64.9    Iron deficiency anemia D50.9    Megaloblastic anemia due to vitamin B12 deficiency D53.1    Admitted to labor and delivery Z78.9       Past Medical History:        Diagnosis Date    H/O echocardiogram 4/15/16    Relatively normal. Moderately tachycardia throughout the procedure heart rates in the 120's    History of Holter monitoring 3/10/16    Abnormal. Sinus rhythm with intermittent first degree AV block,average DR 98 bpm. Predominant sinus tachycardia, HR >100 b[, 99% of the test duration.  Hyperthyroidism affecting pregnancy in third trimester 6/20/2016    Pernicious anemia     B12 injections       Past Surgical History:  History reviewed. No pertinent surgical history.     Social History:    Social History   Substance Use Topics    Smoking status: Current Some Day Smoker     Packs/day: 0.25 Types: Cigarettes    Smokeless tobacco: Never Used      Comment: trying to quit on own    Alcohol use No      Comment: rarely                                Ready to quit: Not Answered  Counseling given: Not Answered      Vital Signs (Current):   Vitals:    06/30/18 1538 06/30/18 1553 06/30/18 1608 06/30/18 1625   BP: 123/71 124/71 125/72 127/75   Pulse: 102 82 83 81   Resp: 16 16 16 18   Temp:       TempSrc:       Weight:       Height:                                                  BP Readings from Last 3 Encounters:   06/30/18 127/75   06/28/18 118/60   06/25/18 120/62       NPO Status: Time of last liquid consumption: 1100                        Time of last solid consumption: 1100                        Date of last liquid consumption: 06/30/18                        Date of last solid food consumption: 06/30/18    BMI:   Wt Readings from Last 3 Encounters:   06/30/18 152 lb (68.9 kg)   06/28/18 152 lb 12.8 oz (69.3 kg)   06/25/18 152 lb 3.2 oz (69 kg)     Body mass index is 23.81 kg/m². CBC:   Lab Results   Component Value Date    WBC 13.2 06/30/2018    RBC 3.96 06/30/2018    HGB 9.1 06/30/2018    HCT 30.7 06/30/2018    MCV 77.5 06/30/2018    RDW 21.5 06/30/2018     06/30/2018       CMP:   Lab Results   Component Value Date     03/01/2016    K 3.7 03/01/2016     03/01/2016    CO2 21 03/01/2016    BUN 5 03/01/2016    CREATININE 0.26 03/01/2016    GFRAA >60 03/01/2016    LABGLOM >60 03/01/2016    GLUCOSE 132 04/16/2018    PROT 6.5 03/01/2016    CALCIUM 8.6 03/01/2016    BILITOT 0.42 03/01/2016    ALKPHOS 69 03/01/2016    AST 17 03/01/2016    ALT 10 03/01/2016       POC Tests: No results for input(s): POCGLU, POCNA, POCK, POCCL, POCBUN, POCHEMO, POCHCT in the last 72 hours.     Coags: No results found for: PROTIME, INR, APTT    HCG (If Applicable):   Lab Results   Component Value Date    PREGTESTUR positive 01/06/2016        ABGs: No results found for: PHART, PO2ART, VVW7RZE, IHC7JZY,

## 2018-06-30 NOTE — FLOWSHEET NOTE
Phi Zaman returns call, updated on MAY and swab done on chux pad that turned positive. Orders received to keep patient on monitors, if starts getting more uncomfortable call her back, if not K Pool, CNM will come in and do spec and fern. Pt updated on POC and verbalizes understanding.

## 2018-06-30 NOTE — ANESTHESIA PROCEDURE NOTES
Epidural Block    Patient location during procedure: OB  Start time: 6/30/2018 4:20 PM  End time: 6/30/2018 4:33 PM  Staffing  Resident/CRNA: Grace Ceballos  Performed: resident/CRNA   Preanesthetic Checklist  Completed: patient identified, site marked, surgical consent, pre-op evaluation, timeout performed, IV checked, risks and benefits discussed, monitors and equipment checked, anesthesia consent given, oxygen available and patient being monitored  Epidural  Patient position: sitting  Prep: ChloraPrep  Patient monitoring: frequent blood pressure checks and continuous pulse ox  Approach: midline  Location: lumbar (1-5) (L4-5)  Injection technique: SHAYLA air  Procedures: paresthesia technique  Provider prep: mask and sterile gloves  Needle  Needle type: Tuohy   Needle gauge: 17 G  Needle length: 3.5 in  Needle insertion depth: 7.5 cm  Catheter type: side hole  Catheter size: 19 G  Catheter at skin depth: 12 cm  Test dose: negative  Assessment  Sensory level: T6  Hemodynamics: stable  Attempts: 1  Additional Notes  Time out prior to placement

## 2018-06-30 NOTE — FLOWSHEET NOTE
Pt aware of POC and K Pool, CNM wanting to start pitocin. Pt requesting if she can eat. Aware she can only have clear liquids now until delivery. Verbalizes understanding.

## 2018-06-30 NOTE — H&P
Department of Obstetrics and Gynecology   Obstetrics History and Physical  H&P Admission Inpatient  Note        CHIEF COMPLAINT:  Questioning leaking of fluid    HISTORY OF PRESENT ILLNESS:      The patient is a 27 y.o. female at 38w3d. OB History      Para Term  AB Living    4 3 3     3    SAB TAB Ectopic Molar Multiple Live Births              3      Patient presents with a chief complaint as above and is being admitted for SROM    Estimated Due Date: Estimated Date of Delivery: 7/10/18    PRENATAL CARE:    Complicated by: anemia    PAST OB HISTORY:  OB History      Para Term  AB Living    4 3 3     3    SAB TAB Ectopic Molar Multiple Live Births              3          Past Medical History:        Diagnosis Date    H/O echocardiogram 4/15/16    Relatively normal. Moderately tachycardia throughout the procedure heart rates in the 120's    History of Holter monitoring 3/10/16    Abnormal. Sinus rhythm with intermittent first degree AV block,average DR 98 bpm. Predominant sinus tachycardia, HR >100 b[, 99% of the test duration.  Hyperthyroidism affecting pregnancy in third trimester 2016    Pernicious anemia     B12 injections     Past Surgical History:    History reviewed. No pertinent surgical history. Allergies:  Patient has no known allergies. Social History:    Social History     Social History    Marital status: Legally      Spouse name: N/A    Number of children: N/A    Years of education: N/A     Occupational History    Not on file.      Social History Main Topics    Smoking status: Current Some Day Smoker     Packs/day: 0.25     Types: Cigarettes    Smokeless tobacco: Never Used      Comment: trying to quit on own    Alcohol use No      Comment: rarely    Drug use: No    Sexual activity: Yes     Partners: Male     Other Topics Concern    Not on file     Social History Narrative    No narrative on file     Family History:   Family History

## 2018-07-01 VITALS
DIASTOLIC BLOOD PRESSURE: 55 MMHG | RESPIRATION RATE: 18 BRPM | HEART RATE: 70 BPM | TEMPERATURE: 97.9 F | WEIGHT: 152 LBS | BODY MASS INDEX: 23.86 KG/M2 | SYSTOLIC BLOOD PRESSURE: 109 MMHG | HEIGHT: 67 IN

## 2018-07-01 PROCEDURE — 6370000000 HC RX 637 (ALT 250 FOR IP): Performed by: ADVANCED PRACTICE MIDWIFE

## 2018-07-01 PROCEDURE — 99024 POSTOP FOLLOW-UP VISIT: CPT | Performed by: ADVANCED PRACTICE MIDWIFE

## 2018-07-01 RX ADMIN — IBUPROFEN 800 MG: 800 TABLET ORAL at 08:13

## 2018-07-01 ASSESSMENT — PAIN SCALES - GENERAL: PAINLEVEL_OUTOF10: 2

## 2018-07-01 NOTE — PROGRESS NOTES
Department of Obstetrics and Gynecology  Labor and Delivery       Post Partum Progress Note             SUBJECTIVE:  Pt is doing well. No problems today her bleeding is controlled. Pt states baby is eating well. OBJECTIVE:      Vitals:  /61   Pulse 81   Temp 97.8 °F (36.6 °C) (Temporal)   Resp 14   Ht 5' 7\" (1.702 m)   Wt 152 lb (68.9 kg)   LMP 12/04/2017 (Approximate)   Breastfeeding?  Unknown   BMI 23.81 kg/m²   Patient Vitals for the past 24 hrs:   BP Temp Temp src Pulse Resp Height Weight   07/01/18 0406 111/61 97.8 °F (36.6 °C) Temporal 81 14 - -   06/30/18 2314 127/64 98.4 °F (36.9 °C) Temporal 95 16 - -   06/30/18 1956 120/61 98.6 °F (37 °C) Oral 90 14 - -   06/30/18 1940 110/70 98.1 °F (36.7 °C) Oral 94 16 - -   06/30/18 1926 115/73 98.2 °F (36.8 °C) Oral 101 14 - -   06/30/18 1911 110/65 98 °F (36.7 °C) Oral 80 14 - -   06/30/18 1856 110/62 - - 90 16 - -   06/30/18 1841 109/69 - - 85 16 - -   06/30/18 1826 107/60 - - 105 16 - -   06/30/18 1811 (!) 110/57 - - 78 16 - -   06/30/18 1749 (!) 108/56 - - 72 16 - -   06/30/18 1734 (!) 116/58 - - 93 18 - -   06/30/18 1729 (!) 115/56 - - 113 18 - -   06/30/18 1725 (!) 118/56 - - 96 20 - -   06/30/18 1720 131/60 - - 80 22 - -   06/30/18 1715 121/71 - - 91 20 - -   06/30/18 1700 117/66 - - 80 20 - -   06/30/18 1655 130/72 - - 84 20 - -   06/30/18 1650 127/67 - - 78 20 - -   06/30/18 1645 122/68 - - 77 20 - -   06/30/18 1639 115/67 - - 84 20 - -   06/30/18 1636 115/67 - - 84 20 - -   06/30/18 1635 120/73 - - 86 20 - -   06/30/18 1634 120/73 - - 86 20 - -   06/30/18 1632 125/72 - - 102 20 - -   06/30/18 1625 127/75 - - 81 18 - -   06/30/18 1608 125/72 - - 83 16 - -   06/30/18 1553 124/71 - - 82 16 - -   06/30/18 1538 123/71 - - 102 16 - -   06/30/18 1523 124/71 - - 84 16 - -   06/30/18 1508 130/61 98.1 °F (36.7 °C) Temporal 78 16 - -   06/30/18 1332 118/77 - - 90 16 - -   06/30/18 1139 119/72 98.2 °F (36.8 °C) Oral 108 18 5' 7\" (1.702 m) 152 lb (68.9 kg) ABDOMEN: normal shape, position and consistency  GENITAL/URINARY:  External Genitalia:  General appearance; normal, Hair distribution; normal, Lesions absent  Uterus:  Size normal, Contour normal, Position normal  Breast:normal appearance, no masses or tenderness  Cor: RRR no Murmurs  Pulmonary: clear to auscultation anterior and posterior  Extremities: no Clubbing cyanosis or ecchymosis    DATA:          ASSESSMENT :      Patient Active Hospital Problem List:   Amniotic fluid leaking (2018)     Megaloblastic anemia due to vitamin B12 deficiency (2015)     Admitted to labor and delivery (2018)      (normal spontaneous vaginal delivery) (2018)          Plan:  Discharge home today

## 2018-07-01 NOTE — PLAN OF CARE
Problem: VAGINAL DELIVERY - RECOVERY AND POST PARTUM  Goal: Vital signs are medically acceptable  Outcome: Met This Shift    Goal: Patient will remain free of falls  Outcome: Met This Shift    Goal: Fundus firm at midline  Outcome: Met This Shift    Goal: Empties bladder  Outcome: Met This Shift    Goal: Edema will be absent or minimal  Outcome: Met This Shift    Goal: Breasts are soft with nipple integrity intact  Outcome: Met This Shift    Goal: Appropriate behavior observed  Outcome: Met This Shift    Goal: Positive Mother-Baby interactions are observed  Outcome: Met This Shift    Goal: Perineum intact without discharge or hematoma  Outcome: Met This Shift    Goal: Ambulates independently  Outcome: Met This Shift      Problem: PAIN  Goal: Patient's pain/discomfort is manageable  Outcome: Ongoing      Problem: KNOWLEDGE DEFICIT  Goal: Patient/S.O. demonstrates understanding of disease process, treatment plan, medications, and discharge instructions.   Outcome: Met This Shift

## 2018-07-01 NOTE — PLAN OF CARE
Problem: VAGINAL DELIVERY - RECOVERY AND POST PARTUM  Goal: Vital signs are medically acceptable  Outcome: Met This Shift    Goal: Patient will remain free of falls  Outcome: Met This Shift    Goal: Fundus firm at midline  Outcome: Met This Shift    Goal: Moderate rubra without clots, no purulent discharge, no foul smelling lochia  Outcome: Met This Shift    Goal: Empties bladder  Outcome: Ongoing    Goal: Verbalizes understanding of normal bowel function resumption  Outcome: Ongoing    Goal: Edema will be absent or minimal  Outcome: Met This Shift    Goal: Breasts are soft with nipple integrity intact  Outcome: Met This Shift    Goal: Demonstrates appropriate breast feeding techniques  Outcome: Completed Date Met: 06/30/18  Patient is bottle feeding infant  Goal: Appropriate behavior observed  Outcome: Met This Shift    Goal: Positive Mother-Baby interactions are observed  Outcome: Met This Shift    Goal: Perineum intact without discharge or hematoma  Outcome: Met This Shift    Goal: Ambulates independently  Outcome: Ongoing      Problem: PAIN  Goal: Patient's pain/discomfort is manageable  Outcome: Met This Shift      Problem: KNOWLEDGE DEFICIT  Goal: Patient/S.O. demonstrates understanding of disease process, treatment plan, medications, and discharge instructions.   Outcome: Ongoing

## 2018-08-23 ENCOUNTER — POSTPARTUM VISIT (OUTPATIENT)
Dept: OBGYN | Age: 33
End: 2018-08-23
Payer: MEDICAID

## 2018-08-23 VITALS
SYSTOLIC BLOOD PRESSURE: 122 MMHG | DIASTOLIC BLOOD PRESSURE: 72 MMHG | HEIGHT: 66 IN | BODY MASS INDEX: 21.24 KG/M2 | WEIGHT: 132.2 LBS

## 2018-08-23 PROBLEM — O42.90 AMNIOTIC FLUID LEAKING: Status: RESOLVED | Noted: 2018-06-30 | Resolved: 2018-08-23

## 2018-08-23 LAB — HGB, POC: 14.3

## 2018-08-23 PROCEDURE — 4004F PT TOBACCO SCREEN RCVD TLK: CPT | Performed by: ADVANCED PRACTICE MIDWIFE

## 2018-08-23 PROCEDURE — G8427 DOCREV CUR MEDS BY ELIG CLIN: HCPCS | Performed by: ADVANCED PRACTICE MIDWIFE

## 2018-08-23 PROCEDURE — G8420 CALC BMI NORM PARAMETERS: HCPCS | Performed by: ADVANCED PRACTICE MIDWIFE

## 2018-08-23 RX ORDER — CYANOCOBALAMIN 1000 UG/ML
1000 INJECTION INTRAMUSCULAR; SUBCUTANEOUS
COMMUNITY
Start: 2015-10-23 | End: 2019-07-12

## 2019-06-04 ENCOUNTER — TELEPHONE (OUTPATIENT)
Dept: OBGYN | Age: 34
End: 2019-06-04

## 2019-06-04 ENCOUNTER — HOSPITAL ENCOUNTER (OUTPATIENT)
Age: 34
Discharge: HOME | End: 2019-06-04
Payer: MEDICAID

## 2019-06-04 DIAGNOSIS — O20.9 BLEEDING IN EARLY PREGNANCY: ICD-10-CM

## 2019-06-04 DIAGNOSIS — O20.9 BLEEDING IN EARLY PREGNANCY: Primary | ICD-10-CM

## 2019-06-04 LAB — HCG QUANTITATIVE: 1327 IU/L

## 2019-06-04 PROCEDURE — 84702 CHORIONIC GONADOTROPIN TEST: CPT

## 2019-06-04 PROCEDURE — 36415 COLL VENOUS BLD VENIPUNCTURE: CPT

## 2019-06-06 ENCOUNTER — HOSPITAL ENCOUNTER (OUTPATIENT)
Age: 34
Discharge: HOME | End: 2019-06-06
Payer: MEDICAID

## 2019-06-06 DIAGNOSIS — O20.9 BLEEDING IN EARLY PREGNANCY: ICD-10-CM

## 2019-06-06 LAB — HCG QUANTITATIVE: 233 IU/L

## 2019-06-06 PROCEDURE — 84702 CHORIONIC GONADOTROPIN TEST: CPT

## 2019-06-06 PROCEDURE — 36415 COLL VENOUS BLD VENIPUNCTURE: CPT

## 2019-06-07 DIAGNOSIS — O03.9 SAB (SPONTANEOUS ABORTION): Primary | ICD-10-CM

## 2019-06-13 ENCOUNTER — HOSPITAL ENCOUNTER (OUTPATIENT)
Age: 34
Discharge: HOME | End: 2019-06-13
Payer: MEDICAID

## 2019-06-13 DIAGNOSIS — O03.9 SAB (SPONTANEOUS ABORTION): ICD-10-CM

## 2019-06-13 LAB — HCG QUANTITATIVE: 8 IU/L

## 2019-06-13 PROCEDURE — 36415 COLL VENOUS BLD VENIPUNCTURE: CPT

## 2019-06-13 PROCEDURE — 84702 CHORIONIC GONADOTROPIN TEST: CPT

## 2019-06-17 DIAGNOSIS — O03.9 SAB (SPONTANEOUS ABORTION): Primary | ICD-10-CM

## 2019-12-31 ENCOUNTER — HOSPITAL ENCOUNTER (OUTPATIENT)
Age: 34
Discharge: HOME | End: 2019-12-31
Payer: MEDICAID

## 2019-12-31 ENCOUNTER — TELEPHONE (OUTPATIENT)
Dept: OBGYN | Age: 34
End: 2019-12-31

## 2019-12-31 DIAGNOSIS — O20.0 THREATENED MISCARRIAGE: Primary | ICD-10-CM

## 2019-12-31 LAB
ABO/RH: NORMAL
ANTIBODY SCREEN: NEGATIVE
HCG QUANTITATIVE: ABNORMAL IU/L

## 2019-12-31 PROCEDURE — 86901 BLOOD TYPING SEROLOGIC RH(D): CPT

## 2019-12-31 PROCEDURE — 86850 RBC ANTIBODY SCREEN: CPT

## 2019-12-31 PROCEDURE — 36415 COLL VENOUS BLD VENIPUNCTURE: CPT

## 2019-12-31 PROCEDURE — 86900 BLOOD TYPING SEROLOGIC ABO: CPT

## 2019-12-31 PROCEDURE — 84702 CHORIONIC GONADOTROPIN TEST: CPT

## 2020-01-02 ENCOUNTER — TELEPHONE (OUTPATIENT)
Dept: OBGYN | Age: 35
End: 2020-01-02

## 2020-01-02 ENCOUNTER — HOSPITAL ENCOUNTER (EMERGENCY)
Age: 35
Discharge: HOME | End: 2020-01-02
Payer: MEDICAID

## 2020-01-02 ENCOUNTER — APPOINTMENT (OUTPATIENT)
Dept: ULTRASOUND IMAGING | Age: 35
End: 2020-01-02
Payer: MEDICAID

## 2020-01-02 VITALS
DIASTOLIC BLOOD PRESSURE: 72 MMHG | WEIGHT: 130 LBS | HEART RATE: 96 BPM | HEIGHT: 66 IN | OXYGEN SATURATION: 100 % | TEMPERATURE: 98.8 F | SYSTOLIC BLOOD PRESSURE: 124 MMHG | BODY MASS INDEX: 20.89 KG/M2 | RESPIRATION RATE: 16 BRPM

## 2020-01-02 LAB
-: NORMAL
ALBUMIN SERPL-MCNC: 4.2 G/DL (ref 3.5–5.2)
ALBUMIN/GLOBULIN RATIO: 1.5 (ref 1–2.5)
ALP BLD-CCNC: 70 U/L (ref 35–104)
ALT SERPL-CCNC: 5 U/L (ref 5–33)
AMORPHOUS: NORMAL
ANION GAP SERPL CALCULATED.3IONS-SCNC: 14 MMOL/L (ref 9–17)
AST SERPL-CCNC: 15 U/L
BACTERIA: NORMAL
BILIRUB SERPL-MCNC: 0.19 MG/DL (ref 0.3–1.2)
BILIRUBIN URINE: NEGATIVE
BUN BLDV-MCNC: 8 MG/DL (ref 6–20)
BUN/CREAT BLD: 16 (ref 9–20)
CALCIUM SERPL-MCNC: 9.2 MG/DL (ref 8.6–10.4)
CASTS UA: NORMAL /LPF
CHLORIDE BLD-SCNC: 105 MMOL/L (ref 98–107)
CO2: 20 MMOL/L (ref 20–31)
COLOR: ABNORMAL
COMMENT UA: ABNORMAL
CREAT SERPL-MCNC: 0.5 MG/DL (ref 0.5–0.9)
CRYSTALS, UA: NORMAL /HPF
EPITHELIAL CELLS UA: NORMAL /HPF (ref 0–25)
GFR AFRICAN AMERICAN: >60 ML/MIN
GFR NON-AFRICAN AMERICAN: >60 ML/MIN
GFR SERPL CREATININE-BSD FRML MDRD: ABNORMAL ML/MIN/{1.73_M2}
GFR SERPL CREATININE-BSD FRML MDRD: ABNORMAL ML/MIN/{1.73_M2}
GLUCOSE BLD-MCNC: 122 MG/DL (ref 70–99)
GLUCOSE URINE: NEGATIVE
HCG QUANTITATIVE: ABNORMAL IU/L
HCT VFR BLD CALC: 29.9 % (ref 36.3–47.1)
HEMOGLOBIN: 8.8 G/DL (ref 11.9–15.1)
KETONES, URINE: NEGATIVE
LEUKOCYTE ESTERASE, URINE: ABNORMAL
MCH RBC QN AUTO: 23.7 PG (ref 25.2–33.5)
MCHC RBC AUTO-ENTMCNC: 29.4 G/DL (ref 28.4–34.8)
MCV RBC AUTO: 80.6 FL (ref 82.6–102.9)
MUCUS: NORMAL
NITRITE, URINE: NEGATIVE
NRBC AUTOMATED: 0 PER 100 WBC
OTHER OBSERVATIONS UA: NORMAL
PDW BLD-RTO: 16.6 % (ref 11.8–14.4)
PH UA: 6 (ref 5–9)
PLATELET # BLD: 268 K/UL (ref 138–453)
PMV BLD AUTO: 10.6 FL (ref 8.1–13.5)
POTASSIUM SERPL-SCNC: 3.4 MMOL/L (ref 3.7–5.3)
PROTEIN UA: ABNORMAL
RBC # BLD: 3.71 M/UL (ref 3.95–5.11)
RBC UA: NORMAL /HPF (ref 0–2)
RENAL EPITHELIAL, UA: NORMAL /HPF
SODIUM BLD-SCNC: 139 MMOL/L (ref 135–144)
SPECIFIC GRAVITY UA: <1.005 (ref 1.01–1.02)
TOTAL PROTEIN: 7 G/DL (ref 6.4–8.3)
TRICHOMONAS: NORMAL
TURBIDITY: CLEAR
URINE HGB: ABNORMAL
UROBILINOGEN, URINE: NORMAL
WBC # BLD: 11.5 K/UL (ref 3.5–11.3)
WBC UA: NORMAL /HPF (ref 0–5)
YEAST: NORMAL

## 2020-01-02 PROCEDURE — 87186 SC STD MICRODIL/AGAR DIL: CPT

## 2020-01-02 PROCEDURE — 76817 TRANSVAGINAL US OBSTETRIC: CPT

## 2020-01-02 PROCEDURE — 80053 COMPREHEN METABOLIC PANEL: CPT

## 2020-01-02 PROCEDURE — 87088 URINE BACTERIA CULTURE: CPT

## 2020-01-02 PROCEDURE — 87086 URINE CULTURE/COLONY COUNT: CPT

## 2020-01-02 PROCEDURE — 85027 COMPLETE CBC AUTOMATED: CPT

## 2020-01-02 PROCEDURE — 99284 EMERGENCY DEPT VISIT MOD MDM: CPT

## 2020-01-02 PROCEDURE — 2580000003 HC RX 258: Performed by: PHYSICIAN ASSISTANT

## 2020-01-02 PROCEDURE — 81001 URINALYSIS AUTO W/SCOPE: CPT

## 2020-01-02 PROCEDURE — 84702 CHORIONIC GONADOTROPIN TEST: CPT

## 2020-01-02 RX ORDER — 0.9 % SODIUM CHLORIDE 0.9 %
1000 INTRAVENOUS SOLUTION INTRAVENOUS ONCE
Status: COMPLETED | OUTPATIENT
Start: 2020-01-02 | End: 2020-01-02

## 2020-01-02 RX ORDER — 0.9 % SODIUM CHLORIDE 0.9 %
500 INTRAVENOUS SOLUTION INTRAVENOUS ONCE
Status: COMPLETED | OUTPATIENT
Start: 2020-01-02 | End: 2020-01-02

## 2020-01-02 RX ADMIN — SODIUM CHLORIDE 500 ML: 9 INJECTION, SOLUTION INTRAVENOUS at 15:45

## 2020-01-02 RX ADMIN — SODIUM CHLORIDE 1000 ML: 9 INJECTION, SOLUTION INTRAVENOUS at 14:32

## 2020-01-02 ASSESSMENT — ENCOUNTER SYMPTOMS
EYE REDNESS: 0
SHORTNESS OF BREATH: 0
COUGH: 0
WHEEZING: 0
BLOOD IN STOOL: 0
VOMITING: 0
DIARRHEA: 0
CHEST TIGHTNESS: 0
EYE DISCHARGE: 0
ABDOMINAL PAIN: 0
RHINORRHEA: 0
SORE THROAT: 0
NAUSEA: 0
CONSTIPATION: 0
BACK PAIN: 0

## 2020-01-02 NOTE — ED NOTES
Discharge education reviewed with patient, she verbalized understanding of need to follow-up with PCP and OB as well as to have labs drawn tomorrow. She denies further questions at this time.      Agustin Guadarrama RN  01/02/20 [id number]

## 2020-01-02 NOTE — ED PROVIDER NOTES
[facility] ED  eMERGENCY dEPARTMENT eNCOUnter      Pt Name: Casey Caban  MRN: [id number]  [place] 1985  Date of evaluation: 1/2/2020  Provider: Ashlee Ramey Dr     Chief Complaint   Patient presents with    Vaginal Bleeding     Onset 4 days ago after recent positive pregnancy test at home. Pt reports she passed a clot yesterday, and bleeding is heavier now. Pt states she had Hcg drawn outpt here 2 days ago         HISTORY OF PRESENT ILLNESS   (Location/Symptom, Timing/Onset, Context/Setting,Quality, Duration, Modifying Factors, Severity)  Note limiting factors. Casey Caban is a38 y.o. female who presents to the emergency department with complaints of vaginal bleeding since earlier in the week. Patient reports that she is G6, P3 and think she could be having a miscarriage. Reports that she called her OB earlier in the week and they did some outpatient blood work. She reports that she continued have bleeding today when she thought she might have actually passed the miscarriage so she came to the ER for further evaluation. States she has a history of pernicious anemia and wanted make sure she was not losing too much blood. She has no other complaints at this time denies chest pain denies shortness of breath denies any syncope episodes. HPI    Nursing Notes werereviewed. REVIEW OF SYSTEMS    (2-9 systems for level 4, 10 or more for level 5)     Review of Systems   Constitutional: Negative for chills, diaphoresis and fever. HENT: Negative for congestion, ear pain, rhinorrhea and sore throat. Eyes: Negative for discharge, redness and visual disturbance. Respiratory: Negative for cough, chest tightness, shortness of breath and wheezing. Cardiovascular: Negative for chest pain and palpitations. Gastrointestinal: Negative for abdominal pain, blood in stool, constipation, diarrhea, nausea and vomiting.    Endocrine: Negative for polydipsia, polyphagia and polyuria. Genitourinary: Positive for vaginal bleeding. Negative for decreased urine volume, difficulty urinating, dysuria, frequency and hematuria. Musculoskeletal: Negative for arthralgias, back pain and myalgias. Skin: Negative for pallor and rash. Allergic/Immunologic: Negative for food allergies and immunocompromised state. Neurological: Negative for dizziness, syncope, weakness and light-headedness. Hematological: Negative for adenopathy. Does not bruise/bleed easily. Psychiatric/Behavioral: Negative for behavioral problems and suicidal ideas. The patient is not nervous/anxious. Except as noted above the remainder of the review of systems was reviewed and negative. PAST MEDICAL HISTORY     Past Medical History:   Diagnosis Date    H/O echocardiogram 4/15/16    Relatively normal. Moderately tachycardia throughout the procedure heart rates in the 120's    History of Holter monitoring 3/10/16    Abnormal. Sinus rhythm with intermittent first degree AV block,average DR 98 bpm. Predominant sinus tachycardia, HR >100 b[, 99% of the test duration.  Hyperthyroidism affecting pregnancy in third trimester 6/20/2016    Pernicious anemia     B12 injections         SURGICALHISTORY     History reviewed. No pertinent surgical history. CURRENT MEDICATIONS       Previous Medications    PRENATAL VIT-FE FUMARATE-FA (PRENATAL PLUS/IRON) 27-1 MG TABS TABLET    Take 1 tablet by mouth daily       ALLERGIES     Patient has no known allergies.     FAMILY HISTORY       Family History   Problem Relation Age of Onset   Gil Alberto Asthma Mother     Diabetes Mother         IDDM    Hypertension Mother     Stroke Mother     No Known Problems Father     Cancer Maternal Grandmother         Breast    Diabetes Maternal Grandmother         IDDM    Cancer Paternal Grandmother         Breast    Heart Failure Paternal Grandmother           SOCIAL HISTORY       Social History     Socioeconomic History    Marital status: Legally      Spouse name: None    Number of children: None    Years of education: None    Highest education level: None   Occupational History    None   Social Needs    Financial resource strain: None    Food insecurity:     Worry: None     Inability: None    Transportation needs:     Medical: None     Non-medical: None   Tobacco Use    Smoking status: Current Some Day Smoker     Packs/day: 0.25     Types: Cigarettes    Smokeless tobacco: Never Used    Tobacco comment: trying to quit on own   Substance and Sexual Activity    Alcohol use: No     Alcohol/week: 0.0 standard drinks     Comment: rarely    Drug use: No    Sexual activity: Yes     Partners: Male   Lifestyle    Physical activity:     Days per week: None     Minutes per session: None    Stress: None   Relationships    Social connections:     Talks on phone: None     Gets together: None     Attends [employer] service: None     Active member of club or organization: None     Attends meetings of clubs or organizations: None     Relationship status: None    Intimate partner violence:     Fear of current or ex partner: None     Emotionally abused: None     Physically abused: None     Forced sexual activity: None   Other Topics Concern    None   Social History Narrative    None       SCREENINGS      @FLOW([id number])@      PHYSICAL EXAM    (up to 7 for level 4, 8 or more for level 5)     ED Triage Vitals [01/02/20 1409]   BP Temp Temp Source Pulse Resp SpO2 Height Weight   124/72 98.8 °F (37.1 °C) Oral 125 18 100 % 5' 6\" (1.676 m) 130 lb (59 kg)       Physical Exam  Vitals signs and nursing note reviewed. Constitutional:       General: She is not in acute distress. Appearance: She is well-developed. She is not ill-appearing, toxic-appearing or diaphoretic. HENT:      Head: Normocephalic and atraumatic.       Right Ear: External ear normal.      Left Ear: External ear normal.      Mouth/Throat:      Pharynx: No oropharyngeal exudate. Eyes:      General: No scleral icterus. Right eye: No discharge. Left eye: No discharge. Conjunctiva/sclera: Conjunctivae normal.      Pupils: Pupils are equal, round, and reactive to light. Neck:      Musculoskeletal: Normal range of motion and neck supple. Thyroid: No thyromegaly. Trachea: No tracheal deviation. Cardiovascular:      Rate and Rhythm: Normal rate and regular rhythm. Pulses: Normal pulses. Heart sounds: No murmur. No friction rub. No gallop. Pulmonary:      Effort: Pulmonary effort is normal. No respiratory distress. Breath sounds: Normal breath sounds. No stridor. No wheezing, rhonchi or rales. Abdominal:      General: Bowel sounds are normal. There is no distension. Palpations: Abdomen is soft. There is no mass. Tenderness: There is no tenderness. There is no guarding or rebound. Hernia: No hernia is present. Genitourinary:     Comments: Patient's nurse, Oda Phalen, was at the bedside during exam.  Patient in supine position. No external bleeding noted. Speculum was inserted with minimal blood pooling. Cervical loss is opened slightly. She has no tenderness. No lesions no foreign bodies. Musculoskeletal: Normal range of motion. General: No tenderness or deformity. Lymphadenopathy:      Cervical: No cervical adenopathy. Skin:     General: Skin is warm and dry. Findings: No erythema or rash. Neurological:      Mental Status: She is alert and oriented to person, place, and time. Cranial Nerves: No cranial nerve deficit. Motor: No abnormal muscle tone. Deep Tendon Reflexes: Reflexes are normal and symmetric.  Reflexes normal.   Psychiatric:         Behavior: Behavior normal.         DIAGNOSTIC RESULTS     EKG: All EKG's are interpreted by the Emergency Department Physician who either signs orCo-signs this chart in the absence of a cardiologist.      RADIOLOGY:

## 2020-01-03 ENCOUNTER — HOSPITAL ENCOUNTER (OUTPATIENT)
Age: 35
Discharge: HOME | End: 2020-01-03
Payer: MEDICAID

## 2020-01-03 LAB
HCT VFR BLD CALC: 24.9 % (ref 36.3–47.1)
HEMOGLOBIN: 7.2 G/DL (ref 11.9–15.1)

## 2020-01-03 PROCEDURE — 85018 HEMOGLOBIN: CPT

## 2020-01-03 PROCEDURE — 85014 HEMATOCRIT: CPT

## 2020-01-03 PROCEDURE — 36415 COLL VENOUS BLD VENIPUNCTURE: CPT

## 2020-01-04 LAB
CULTURE: ABNORMAL
Lab: ABNORMAL
SPECIMEN DESCRIPTION: ABNORMAL

## 2020-01-06 ENCOUNTER — HOSPITAL ENCOUNTER (OUTPATIENT)
Dept: ULTRASOUND IMAGING | Age: 35
Discharge: HOME | DRG: 543 | End: 2020-01-08
Payer: MEDICAID

## 2020-01-06 PROCEDURE — 76801 OB US < 14 WKS SINGLE FETUS: CPT

## 2020-01-07 ENCOUNTER — OFFICE VISIT (OUTPATIENT)
Dept: OBGYN | Age: 35
DRG: 543 | End: 2020-01-07
Payer: MEDICAID

## 2020-01-07 ENCOUNTER — HOSPITAL ENCOUNTER (INPATIENT)
Age: 35
LOS: 1 days | Discharge: HOME | DRG: 543 | End: 2020-01-08
Attending: EMERGENCY MEDICINE | Admitting: OBSTETRICS & GYNECOLOGY
Payer: MEDICAID

## 2020-01-07 VITALS
DIASTOLIC BLOOD PRESSURE: 64 MMHG | BODY MASS INDEX: 20.89 KG/M2 | WEIGHT: 130 LBS | HEIGHT: 66 IN | SYSTOLIC BLOOD PRESSURE: 110 MMHG

## 2020-01-07 LAB
ABSOLUTE EOS #: 0.1 K/UL (ref 0–0.44)
ABSOLUTE IMMATURE GRANULOCYTE: 0.21 K/UL (ref 0–0.3)
ABSOLUTE LYMPH #: 2.08 K/UL (ref 1.1–3.7)
ABSOLUTE MONO #: 0.83 K/UL (ref 0.1–1.2)
ANION GAP SERPL CALCULATED.3IONS-SCNC: 15 MMOL/L (ref 9–17)
BASOPHILS # BLD: 0 % (ref 0–2)
BASOPHILS ABSOLUTE: 0 K/UL (ref 0–0.2)
BUN BLDV-MCNC: 12 MG/DL (ref 6–20)
BUN/CREAT BLD: 22 (ref 9–20)
CALCIUM SERPL-MCNC: 9.1 MG/DL (ref 8.6–10.4)
CHLORIDE BLD-SCNC: 96 MMOL/L (ref 98–107)
CO2: 21 MMOL/L (ref 20–31)
CREAT SERPL-MCNC: 0.54 MG/DL (ref 0.5–0.9)
DIFFERENTIAL TYPE: ABNORMAL
EOSINOPHILS RELATIVE PERCENT: 1 % (ref 1–4)
GFR AFRICAN AMERICAN: >60 ML/MIN
GFR NON-AFRICAN AMERICAN: >60 ML/MIN
GFR SERPL CREATININE-BSD FRML MDRD: ABNORMAL ML/MIN/{1.73_M2}
GFR SERPL CREATININE-BSD FRML MDRD: ABNORMAL ML/MIN/{1.73_M2}
GLUCOSE BLD-MCNC: 111 MG/DL (ref 70–99)
HCG QUANTITATIVE: 5009 IU/L
HCT VFR BLD CALC: 21.8 % (ref 36.3–47.1)
HEMOGLOBIN: 6.7 G/DL (ref 11.9–15.1)
IMMATURE GRANULOCYTES: 2 %
INR BLD: 1.1 (ref 0.9–1.2)
LYMPHOCYTES # BLD: 20 % (ref 24–43)
MAGNESIUM: 1.9 MG/DL (ref 1.6–2.6)
MCH RBC QN AUTO: 24.5 PG (ref 25.2–33.5)
MCHC RBC AUTO-ENTMCNC: 30.7 G/DL (ref 28.4–34.8)
MCV RBC AUTO: 79.6 FL (ref 82.6–102.9)
MONOCYTES # BLD: 8 % (ref 3–12)
MORPHOLOGY: ABNORMAL
MORPHOLOGY: ABNORMAL
NRBC AUTOMATED: 0 PER 100 WBC
PARTIAL THROMBOPLASTIN TIME: 24.7 SEC (ref 23.2–34.4)
PDW BLD-RTO: 16.6 % (ref 11.8–14.4)
PLATELET # BLD: 301 K/UL (ref 138–453)
PLATELET ESTIMATE: ABNORMAL
PMV BLD AUTO: 10.2 FL (ref 8.1–13.5)
POTASSIUM SERPL-SCNC: 3.4 MMOL/L (ref 3.7–5.3)
PROTHROMBIN TIME: 11 SEC (ref 9.7–12.2)
RBC # BLD: 2.74 M/UL (ref 3.95–5.11)
RBC # BLD: ABNORMAL 10*6/UL
SEG NEUTROPHILS: 69 % (ref 36–65)
SEGMENTED NEUTROPHILS ABSOLUTE COUNT: 7.18 K/UL (ref 1.5–8.1)
SODIUM BLD-SCNC: 132 MMOL/L (ref 135–144)
WBC # BLD: 10.4 K/UL (ref 3.5–11.3)
WBC # BLD: ABNORMAL 10*3/UL

## 2020-01-07 PROCEDURE — 86850 RBC ANTIBODY SCREEN: CPT

## 2020-01-07 PROCEDURE — 99284 EMERGENCY DEPT VISIT MOD MDM: CPT

## 2020-01-07 PROCEDURE — 85025 COMPLETE CBC W/AUTO DIFF WBC: CPT

## 2020-01-07 PROCEDURE — G8484 FLU IMMUNIZE NO ADMIN: HCPCS | Performed by: ADVANCED PRACTICE MIDWIFE

## 2020-01-07 PROCEDURE — 86905 BLOOD TYPING RBC ANTIGENS: CPT

## 2020-01-07 PROCEDURE — 36415 COLL VENOUS BLD VENIPUNCTURE: CPT

## 2020-01-07 PROCEDURE — 86901 BLOOD TYPING SEROLOGIC RH(D): CPT

## 2020-01-07 PROCEDURE — 84702 CHORIONIC GONADOTROPIN TEST: CPT

## 2020-01-07 PROCEDURE — 85610 PROTHROMBIN TIME: CPT

## 2020-01-07 PROCEDURE — 2580000003 HC RX 258: Performed by: EMERGENCY MEDICINE

## 2020-01-07 PROCEDURE — 86920 COMPATIBILITY TEST SPIN: CPT

## 2020-01-07 PROCEDURE — G8420 CALC BMI NORM PARAMETERS: HCPCS | Performed by: ADVANCED PRACTICE MIDWIFE

## 2020-01-07 PROCEDURE — 85730 THROMBOPLASTIN TIME PARTIAL: CPT

## 2020-01-07 PROCEDURE — 4004F PT TOBACCO SCREEN RCVD TLK: CPT | Performed by: ADVANCED PRACTICE MIDWIFE

## 2020-01-07 PROCEDURE — 99213 OFFICE O/P EST LOW 20 MIN: CPT | Performed by: ADVANCED PRACTICE MIDWIFE

## 2020-01-07 PROCEDURE — 86900 BLOOD TYPING SEROLOGIC ABO: CPT

## 2020-01-07 PROCEDURE — 99211 OFF/OP EST MAY X REQ PHY/QHP: CPT | Performed by: ADVANCED PRACTICE MIDWIFE

## 2020-01-07 PROCEDURE — 86870 RBC ANTIBODY IDENTIFICATION: CPT

## 2020-01-07 PROCEDURE — 83735 ASSAY OF MAGNESIUM: CPT

## 2020-01-07 PROCEDURE — G8427 DOCREV CUR MEDS BY ELIG CLIN: HCPCS | Performed by: ADVANCED PRACTICE MIDWIFE

## 2020-01-07 PROCEDURE — 80048 BASIC METABOLIC PNL TOTAL CA: CPT

## 2020-01-07 RX ORDER — 0.9 % SODIUM CHLORIDE 0.9 %
250 INTRAVENOUS SOLUTION INTRAVENOUS ONCE
Status: DISCONTINUED | OUTPATIENT
Start: 2020-01-07 | End: 2020-01-08 | Stop reason: HOSPADM

## 2020-01-07 RX ORDER — 0.9 % SODIUM CHLORIDE 0.9 %
1000 INTRAVENOUS SOLUTION INTRAVENOUS ONCE
Status: COMPLETED | OUTPATIENT
Start: 2020-01-07 | End: 2020-01-07

## 2020-01-07 RX ORDER — MISOPROSTOL 200 UG/1
800 TABLET ORAL ONCE
Qty: 4 TABLET | Refills: 0 | Status: ON HOLD | OUTPATIENT
Start: 2020-01-07 | End: 2020-01-08 | Stop reason: HOSPADM

## 2020-01-07 RX ADMIN — SODIUM CHLORIDE 1000 ML: 9 INJECTION, SOLUTION INTRAVENOUS at 22:25

## 2020-01-07 ASSESSMENT — ENCOUNTER SYMPTOMS
NAUSEA: 0
SORE THROAT: 0
COUGH: 0
DIARRHEA: 0
ABDOMINAL PAIN: 1
SHORTNESS OF BREATH: 0
COLOR CHANGE: 0
VOMITING: 0

## 2020-01-07 ASSESSMENT — PAIN DESCRIPTION - DESCRIPTORS: DESCRIPTORS: CRAMPING

## 2020-01-07 ASSESSMENT — PATIENT HEALTH QUESTIONNAIRE - PHQ9
1. LITTLE INTEREST OR PLEASURE IN DOING THINGS: 1
2. FEELING DOWN, DEPRESSED OR HOPELESS: 0
SUM OF ALL RESPONSES TO PHQ QUESTIONS 1-9: 1
SUM OF ALL RESPONSES TO PHQ QUESTIONS 1-9: 1
SUM OF ALL RESPONSES TO PHQ9 QUESTIONS 1 & 2: 1

## 2020-01-07 ASSESSMENT — PAIN DESCRIPTION - FREQUENCY: FREQUENCY: CONTINUOUS

## 2020-01-07 ASSESSMENT — PAIN DESCRIPTION - PAIN TYPE: TYPE: ACUTE PAIN

## 2020-01-07 ASSESSMENT — PAIN SCALES - GENERAL: PAINLEVEL_OUTOF10: 4

## 2020-01-07 ASSESSMENT — PAIN DESCRIPTION - ORIENTATION: ORIENTATION: MID

## 2020-01-07 ASSESSMENT — PAIN DESCRIPTION - LOCATION: LOCATION: ABDOMEN

## 2020-01-08 ENCOUNTER — ANESTHESIA (OUTPATIENT)
Dept: OPERATING ROOM | Age: 35
DRG: 543 | End: 2020-01-08
Payer: MEDICAID

## 2020-01-08 ENCOUNTER — ANESTHESIA EVENT (OUTPATIENT)
Dept: OPERATING ROOM | Age: 35
DRG: 543 | End: 2020-01-08
Payer: MEDICAID

## 2020-01-08 VITALS
SYSTOLIC BLOOD PRESSURE: 95 MMHG | BODY MASS INDEX: 20.96 KG/M2 | HEIGHT: 66 IN | HEART RATE: 77 BPM | TEMPERATURE: 97.9 F | WEIGHT: 130.4 LBS | DIASTOLIC BLOOD PRESSURE: 58 MMHG | OXYGEN SATURATION: 99 % | RESPIRATION RATE: 16 BRPM

## 2020-01-08 VITALS
SYSTOLIC BLOOD PRESSURE: 94 MMHG | RESPIRATION RATE: 2 BRPM | DIASTOLIC BLOOD PRESSURE: 41 MMHG | OXYGEN SATURATION: 99 % | TEMPERATURE: 98.2 F

## 2020-01-08 PROBLEM — Z78.9 ADMITTED TO LABOR AND DELIVERY: Status: RESOLVED | Noted: 2018-06-30 | Resolved: 2020-01-08

## 2020-01-08 PROBLEM — O03.9 SPONTANEOUS ABORTION: Status: ACTIVE | Noted: 2020-01-08

## 2020-01-08 LAB
HCT VFR BLD CALC: 24.8 % (ref 36.3–47.1)
HEMOGLOBIN: 7.7 G/DL (ref 11.9–15.1)

## 2020-01-08 PROCEDURE — 2709999900 HC NON-CHARGEABLE SUPPLY: Performed by: OBSTETRICS & GYNECOLOGY

## 2020-01-08 PROCEDURE — 10D17ZZ EXTRACTION OF PRODUCTS OF CONCEPTION, RETAINED, VIA NATURAL OR ARTIFICIAL OPENING: ICD-10-PCS | Performed by: OBSTETRICS & GYNECOLOGY

## 2020-01-08 PROCEDURE — G0378 HOSPITAL OBSERVATION PER HR: HCPCS

## 2020-01-08 PROCEDURE — 3700000001 HC ADD 15 MINUTES (ANESTHESIA): Performed by: OBSTETRICS & GYNECOLOGY

## 2020-01-08 PROCEDURE — 3700000000 HC ANESTHESIA ATTENDED CARE: Performed by: OBSTETRICS & GYNECOLOGY

## 2020-01-08 PROCEDURE — 85018 HEMOGLOBIN: CPT

## 2020-01-08 PROCEDURE — 6360000002 HC RX W HCPCS: Performed by: NURSE ANESTHETIST, CERTIFIED REGISTERED

## 2020-01-08 PROCEDURE — 6370000000 HC RX 637 (ALT 250 FOR IP): Performed by: OBSTETRICS & GYNECOLOGY

## 2020-01-08 PROCEDURE — 6360000002 HC RX W HCPCS: Performed by: OBSTETRICS & GYNECOLOGY

## 2020-01-08 PROCEDURE — 88305 TISSUE EXAM BY PATHOLOGIST: CPT

## 2020-01-08 PROCEDURE — 2580000003 HC RX 258: Performed by: OBSTETRICS & GYNECOLOGY

## 2020-01-08 PROCEDURE — 85014 HEMATOCRIT: CPT

## 2020-01-08 PROCEDURE — 2580000003 HC RX 258: Performed by: NURSE ANESTHETIST, CERTIFIED REGISTERED

## 2020-01-08 PROCEDURE — 2500000003 HC RX 250 WO HCPCS: Performed by: NURSE ANESTHETIST, CERTIFIED REGISTERED

## 2020-01-08 PROCEDURE — 1200000000 HC SEMI PRIVATE

## 2020-01-08 PROCEDURE — 7100000000 HC PACU RECOVERY - FIRST 15 MIN: Performed by: OBSTETRICS & GYNECOLOGY

## 2020-01-08 PROCEDURE — 3600000012 HC SURGERY LEVEL 2 ADDTL 15MIN: Performed by: OBSTETRICS & GYNECOLOGY

## 2020-01-08 PROCEDURE — 7100000001 HC PACU RECOVERY - ADDTL 15 MIN: Performed by: OBSTETRICS & GYNECOLOGY

## 2020-01-08 PROCEDURE — 3600000002 HC SURGERY LEVEL 2 BASE: Performed by: OBSTETRICS & GYNECOLOGY

## 2020-01-08 PROCEDURE — P9016 RBC LEUKOCYTES REDUCED: HCPCS

## 2020-01-08 PROCEDURE — 59820 CARE OF MISCARRIAGE: CPT | Performed by: OBSTETRICS & GYNECOLOGY

## 2020-01-08 PROCEDURE — 36430 TRANSFUSION BLD/BLD COMPNT: CPT

## 2020-01-08 RX ORDER — PROMETHAZINE HYDROCHLORIDE 25 MG/ML
6.25 INJECTION, SOLUTION INTRAMUSCULAR; INTRAVENOUS
Status: DISCONTINUED | OUTPATIENT
Start: 2020-01-08 | End: 2020-01-08 | Stop reason: HOSPADM

## 2020-01-08 RX ORDER — FENTANYL CITRATE 50 UG/ML
50 INJECTION, SOLUTION INTRAMUSCULAR; INTRAVENOUS EVERY 5 MIN PRN
Status: DISCONTINUED | OUTPATIENT
Start: 2020-01-08 | End: 2020-01-08 | Stop reason: HOSPADM

## 2020-01-08 RX ORDER — ONDANSETRON 2 MG/ML
INJECTION INTRAMUSCULAR; INTRAVENOUS PRN
Status: DISCONTINUED | OUTPATIENT
Start: 2020-01-08 | End: 2020-01-08 | Stop reason: SDUPTHER

## 2020-01-08 RX ORDER — LIDOCAINE HYDROCHLORIDE 20 MG/ML
INJECTION, SOLUTION EPIDURAL; INFILTRATION; INTRACAUDAL; PERINEURAL PRN
Status: DISCONTINUED | OUTPATIENT
Start: 2020-01-08 | End: 2020-01-08 | Stop reason: SDUPTHER

## 2020-01-08 RX ORDER — HYDROCODONE BITARTRATE AND ACETAMINOPHEN 5; 325 MG/1; MG/1
1 TABLET ORAL EVERY 4 HOURS PRN
Status: DISCONTINUED | OUTPATIENT
Start: 2020-01-08 | End: 2020-01-08 | Stop reason: HOSPADM

## 2020-01-08 RX ORDER — HYDROCODONE BITARTRATE AND ACETAMINOPHEN 5; 325 MG/1; MG/1
2 TABLET ORAL PRN
Status: DISCONTINUED | OUTPATIENT
Start: 2020-01-08 | End: 2020-01-08 | Stop reason: HOSPADM

## 2020-01-08 RX ORDER — ONDANSETRON 2 MG/ML
4 INJECTION INTRAMUSCULAR; INTRAVENOUS EVERY 8 HOURS PRN
Status: DISCONTINUED | OUTPATIENT
Start: 2020-01-08 | End: 2020-01-08 | Stop reason: HOSPADM

## 2020-01-08 RX ORDER — FENTANYL CITRATE 50 UG/ML
25 INJECTION, SOLUTION INTRAMUSCULAR; INTRAVENOUS EVERY 5 MIN PRN
Status: DISCONTINUED | OUTPATIENT
Start: 2020-01-08 | End: 2020-01-08 | Stop reason: HOSPADM

## 2020-01-08 RX ORDER — SODIUM CHLORIDE, SODIUM LACTATE, POTASSIUM CHLORIDE, CALCIUM CHLORIDE 600; 310; 30; 20 MG/100ML; MG/100ML; MG/100ML; MG/100ML
INJECTION, SOLUTION INTRAVENOUS CONTINUOUS PRN
Status: DISCONTINUED | OUTPATIENT
Start: 2020-01-08 | End: 2020-01-08 | Stop reason: SDUPTHER

## 2020-01-08 RX ORDER — ACETAMINOPHEN 325 MG/1
650 TABLET ORAL EVERY 4 HOURS PRN
Status: DISCONTINUED | OUTPATIENT
Start: 2020-01-08 | End: 2020-01-08 | Stop reason: HOSPADM

## 2020-01-08 RX ORDER — PROPOFOL 10 MG/ML
INJECTION, EMULSION INTRAVENOUS PRN
Status: DISCONTINUED | OUTPATIENT
Start: 2020-01-08 | End: 2020-01-08 | Stop reason: SDUPTHER

## 2020-01-08 RX ORDER — HYDROCODONE BITARTRATE AND ACETAMINOPHEN 5; 325 MG/1; MG/1
1 TABLET ORAL PRN
Status: DISCONTINUED | OUTPATIENT
Start: 2020-01-08 | End: 2020-01-08 | Stop reason: HOSPADM

## 2020-01-08 RX ORDER — SODIUM CHLORIDE 0.9 % (FLUSH) 0.9 %
10 SYRINGE (ML) INJECTION EVERY 12 HOURS SCHEDULED
Status: DISCONTINUED | OUTPATIENT
Start: 2020-01-08 | End: 2020-01-08 | Stop reason: HOSPADM

## 2020-01-08 RX ORDER — CARBOPROST TROMETHAMINE 250 UG/ML
INJECTION, SOLUTION INTRAMUSCULAR PRN
Status: DISCONTINUED | OUTPATIENT
Start: 2020-01-08 | End: 2020-01-08 | Stop reason: SDUPTHER

## 2020-01-08 RX ORDER — HYDROCODONE BITARTRATE AND ACETAMINOPHEN 5; 325 MG/1; MG/1
1 TABLET ORAL EVERY 6 HOURS PRN
Qty: 10 TABLET | Refills: 0 | Status: SHIPPED | OUTPATIENT
Start: 2020-01-08 | End: 2020-01-11

## 2020-01-08 RX ORDER — FENTANYL CITRATE 50 UG/ML
INJECTION, SOLUTION INTRAMUSCULAR; INTRAVENOUS PRN
Status: DISCONTINUED | OUTPATIENT
Start: 2020-01-08 | End: 2020-01-08 | Stop reason: SDUPTHER

## 2020-01-08 RX ORDER — HYDROCODONE BITARTRATE AND ACETAMINOPHEN 5; 325 MG/1; MG/1
2 TABLET ORAL EVERY 4 HOURS PRN
Status: DISCONTINUED | OUTPATIENT
Start: 2020-01-08 | End: 2020-01-08 | Stop reason: HOSPADM

## 2020-01-08 RX ORDER — KETOROLAC TROMETHAMINE 30 MG/ML
INJECTION, SOLUTION INTRAMUSCULAR; INTRAVENOUS PRN
Status: DISCONTINUED | OUTPATIENT
Start: 2020-01-08 | End: 2020-01-08 | Stop reason: SDUPTHER

## 2020-01-08 RX ORDER — DEXAMETHASONE SODIUM PHOSPHATE 4 MG/ML
INJECTION, SOLUTION INTRA-ARTICULAR; INTRALESIONAL; INTRAMUSCULAR; INTRAVENOUS; SOFT TISSUE PRN
Status: DISCONTINUED | OUTPATIENT
Start: 2020-01-08 | End: 2020-01-08 | Stop reason: SDUPTHER

## 2020-01-08 RX ORDER — SODIUM CHLORIDE 0.9 % (FLUSH) 0.9 %
10 SYRINGE (ML) INJECTION PRN
Status: DISCONTINUED | OUTPATIENT
Start: 2020-01-08 | End: 2020-01-08 | Stop reason: HOSPADM

## 2020-01-08 RX ORDER — DEXTROSE, SODIUM CHLORIDE, SODIUM LACTATE, POTASSIUM CHLORIDE, AND CALCIUM CHLORIDE 5; .6; .31; .03; .02 G/100ML; G/100ML; G/100ML; G/100ML; G/100ML
INJECTION, SOLUTION INTRAVENOUS CONTINUOUS
Status: DISCONTINUED | OUTPATIENT
Start: 2020-01-08 | End: 2020-01-08 | Stop reason: HOSPADM

## 2020-01-08 RX ORDER — SODIUM CHLORIDE, SODIUM LACTATE, POTASSIUM CHLORIDE, CALCIUM CHLORIDE 600; 310; 30; 20 MG/100ML; MG/100ML; MG/100ML; MG/100ML
INJECTION, SOLUTION INTRAVENOUS CONTINUOUS
Status: DISCONTINUED | OUTPATIENT
Start: 2020-01-08 | End: 2020-01-08 | Stop reason: HOSPADM

## 2020-01-08 RX ORDER — ONDANSETRON 2 MG/ML
4 INJECTION INTRAMUSCULAR; INTRAVENOUS
Status: COMPLETED | OUTPATIENT
Start: 2020-01-08 | End: 2020-01-08

## 2020-01-08 RX ADMIN — LIDOCAINE HYDROCHLORIDE 80 MG: 20 INJECTION, SOLUTION EPIDURAL; INFILTRATION; INTRACAUDAL at 10:07

## 2020-01-08 RX ADMIN — CARBOPROST TROMETHAMINE 250 MCG: 250 INJECTION, SOLUTION INTRAMUSCULAR at 10:29

## 2020-01-08 RX ADMIN — FENTANYL CITRATE 25 MCG: 50 INJECTION INTRAMUSCULAR; INTRAVENOUS at 11:06

## 2020-01-08 RX ADMIN — ONDANSETRON 4 MG: 2 INJECTION INTRAMUSCULAR; INTRAVENOUS at 10:17

## 2020-01-08 RX ADMIN — KETOROLAC TROMETHAMINE 30 MG: 30 INJECTION, SOLUTION INTRAMUSCULAR; INTRAVENOUS at 10:21

## 2020-01-08 RX ADMIN — SODIUM CHLORIDE, POTASSIUM CHLORIDE, SODIUM LACTATE AND CALCIUM CHLORIDE: 600; 310; 30; 20 INJECTION, SOLUTION INTRAVENOUS at 10:07

## 2020-01-08 RX ADMIN — DEXTROSE MONOHYDRATE 2 G: 50 INJECTION, SOLUTION INTRAVENOUS at 09:56

## 2020-01-08 RX ADMIN — DEXAMETHASONE SODIUM PHOSPHATE 4 MG: 4 INJECTION, SOLUTION INTRAMUSCULAR; INTRAVENOUS at 10:17

## 2020-01-08 RX ADMIN — HYDROCODONE BITARTRATE AND ACETAMINOPHEN 1 TABLET: 5; 325 TABLET ORAL at 13:38

## 2020-01-08 RX ADMIN — FENTANYL CITRATE 50 MCG: 50 INJECTION INTRAMUSCULAR; INTRAVENOUS at 10:21

## 2020-01-08 RX ADMIN — ONDANSETRON 4 MG: 2 INJECTION INTRAMUSCULAR; INTRAVENOUS at 10:48

## 2020-01-08 RX ADMIN — FENTANYL CITRATE 50 MCG: 50 INJECTION INTRAMUSCULAR; INTRAVENOUS at 10:07

## 2020-01-08 RX ADMIN — PROPOFOL 150 MG: 10 INJECTION, EMULSION INTRAVENOUS at 10:07

## 2020-01-08 RX ADMIN — FENTANYL CITRATE 25 MCG: 50 INJECTION INTRAMUSCULAR; INTRAVENOUS at 10:56

## 2020-01-08 ASSESSMENT — PULMONARY FUNCTION TESTS
PIF_VALUE: 10
PIF_VALUE: 1
PIF_VALUE: 10
PIF_VALUE: 3
PIF_VALUE: 0
PIF_VALUE: 10
PIF_VALUE: 1
PIF_VALUE: 0
PIF_VALUE: 0
PIF_VALUE: 2
PIF_VALUE: 10
PIF_VALUE: 10
PIF_VALUE: 1
PIF_VALUE: 10
PIF_VALUE: 0
PIF_VALUE: 10
PIF_VALUE: 1
PIF_VALUE: 2
PIF_VALUE: 19
PIF_VALUE: 10
PIF_VALUE: 0
PIF_VALUE: 10
PIF_VALUE: 1
PIF_VALUE: 1
PIF_VALUE: 0
PIF_VALUE: 1
PIF_VALUE: 0
PIF_VALUE: 10
PIF_VALUE: 2
PIF_VALUE: 1
PIF_VALUE: 20
PIF_VALUE: 2
PIF_VALUE: 10

## 2020-01-08 ASSESSMENT — PAIN SCALES - GENERAL
PAINLEVEL_OUTOF10: 0
PAINLEVEL_OUTOF10: 1
PAINLEVEL_OUTOF10: 4
PAINLEVEL_OUTOF10: 0
PAINLEVEL_OUTOF10: 4
PAINLEVEL_OUTOF10: 2
PAINLEVEL_OUTOF10: 0
PAINLEVEL_OUTOF10: 0
PAINLEVEL_OUTOF10: 4
PAINLEVEL_OUTOF10: 2
PAINLEVEL_OUTOF10: 5

## 2020-01-08 ASSESSMENT — PAIN DESCRIPTION - DESCRIPTORS
DESCRIPTORS: ACHING
DESCRIPTORS: CRAMPING

## 2020-01-08 ASSESSMENT — PAIN DESCRIPTION - PAIN TYPE: TYPE: SURGICAL PAIN

## 2020-01-08 ASSESSMENT — LIFESTYLE VARIABLES: SMOKING_STATUS: 1

## 2020-01-08 ASSESSMENT — PAIN DESCRIPTION - LOCATION: LOCATION: ABDOMEN

## 2020-01-08 NOTE — ANESTHESIA PRE PROCEDURE
Department of Anesthesiology  Preprocedure Note       Name:  Alva Paul   Age:  37 y.o.  :  1985                                          MRN:  [id number]         Date:  2020      Surgeon: Jose Cruz Simental): Clearance MD Sal    Procedure: DILATATION AND CURETTAGE SUCTION (N/A )    Medications prior to admission:   Prior to Admission medications    Medication Sig Start Date End Date Taking? Authorizing Provider   misoprostol (CYTOTEC) 200 MCG tablet Take 4 tablets by mouth once for 1 dose 20  CYNTHIA Alicia CNM   Prenatal Vit-Fe Fumarate-FA (PRENATAL PLUS/IRON) 27-1 MG TABS tablet Take 1 tablet by mouth daily  Patient not taking: Reported on 2020 3/22/18   CYNTHIA Alicia CNM       Current medications:    Current Facility-Administered Medications   Medication Dose Route Frequency Provider Last Rate Last Dose    [MAR Hold] influenza quadrivalent split vaccine (FLUZONE;FLUARIX;FLULAVAL;AFLURIA) injection 0.5 mL  0.5 mL Intramuscular Once [facility] Hold] dextrose 5 % in lactated ringers infusion   Intravenous Continuous [facility] Hold] 0.9 % sodium chloride bolus  250 mL Intravenous Once Whitney Mays DO           Allergies:  No Known Allergies    Problem List:    Patient Active Problem List   Diagnosis Code    Anemia D64.9    Iron deficiency anemia D50.9    Megaloblastic anemia due to vitamin B12 deficiency D53.1    Admitted to labor and delivery Z78.9       Past Medical History:        Diagnosis Date    H/O echocardiogram 4/15/16    Relatively normal. Moderately tachycardia throughout the procedure heart rates in the 120's    History of Holter monitoring 3/10/16    Abnormal. Sinus rhythm with intermittent first degree AV block,average DR 98 bpm. Predominant sinus tachycardia, HR >100 b[, 99% of the test duration.     Hyperthyroidism affecting pregnancy in third trimester 2016    Pernicious anemia     B12 injections Past Surgical History:  No past surgical history on file. Social History:    Social History     Tobacco Use    Smoking status: Current Some Day Smoker     Packs/day: 0.25     Types: Cigarettes    Smokeless tobacco: Never Used    Tobacco comment: trying to quit on own   Substance Use Topics    Alcohol use: No     Alcohol/week: 0.0 standard drinks     Comment: rarely                                Ready to quit: Not Answered  Counseling given: Not Answered  Comment: trying to quit on own      Vital Signs (Current):   Vitals:    01/08/20 0647 01/08/20 0749 01/08/20 0804 01/08/20 0831   BP: (!) 83/44 (!) 72/31 (!) 95/46    Pulse: 86 83 89    Resp: 16 16 16    Temp: 37 °C (98.6 °F)  37.1 °C (98.7 °F)    TempSrc: Temporal Temporal Temporal    SpO2: 98% 97% 99%    Weight:       Height:    5' 6\" (1.676 m)                                              BP Readings from Last 3 Encounters:   01/08/20 (!) 95/46   01/07/20 110/64   01/02/20 124/72       NPO Status:                                                                                 BMI:   Wt Readings from Last 3 Encounters:   01/08/20 130 lb 6.4 oz (59.1 kg)   01/07/20 130 lb (59 kg)   01/02/20 130 lb (59 kg)     Body mass index is 21.05 kg/m².     CBC:   Lab Results   Component Value Date    WBC 10.4 01/07/2020    RBC 2.74 01/07/2020    HGB 6.7 01/07/2020    HCT 21.8 01/07/2020    MCV 79.6 01/07/2020    RDW 16.6 01/07/2020     01/07/2020       CMP:   Lab Results   Component Value Date     01/07/2020    K 3.4 01/07/2020    CL 96 01/07/2020    CO2 21 01/07/2020    BUN 12 01/07/2020    CREATININE 0.54 01/07/2020    GFRAA >60 01/07/2020    LABGLOM >60 01/07/2020    GLUCOSE 111 01/07/2020    PROT 7.0 01/02/2020    CALCIUM 9.1 01/07/2020    BILITOT 0.19 01/02/2020    ALKPHOS 70 01/02/2020    AST 15 01/02/2020    ALT 5 01/02/2020       POC Tests: No results for input(s): POCGLU, POCNA, POCK, POCCL, POCBUN, POCHEMO, POCHCT in the last 72

## 2020-01-08 NOTE — OP NOTE
[address] [place], [place] [address]                                OPERATIVE REPORT    PATIENT NAME: Rupa May                     :        1985  MED REC NO:   [id number]                              ROOM:  ACCOUNT NO:   [de-identified]                           ADMIT DATE: 2020  PROVIDER:     Kylee Potts MD    DATE OF PROCEDURE:  2020    PREOPERATIVE DIAGNOSES:  1. Spontaneous , first trimester. 2.  Chronic anemia and anemia secondary to blood loss. POSTOPERATIVE DIAGNOSES:  1. Spontaneous , first trimester. 2.  Chronic anemia and anemia secondary to blood loss. SURGICAL PROCEDURE:  Dilatation and evacuation. ANESTHESIA:  General.    ESTIMATED BLOOD LOSS OF THE PROCEDURE:  Under 100 mL. COMPLICATIONS OF THE PROCEDURE:  None. FINDINGS:  A copious amount of products of conception. The patient was taken to the operating room. The patient has a long  history of megaloblastic and iron deficiency anemia. She did present to  the emergency room last night not with heavy bleeding, but with chronic  lighter bleeding and her hemoglobin was noted to be 6.7. With these  findings, it was decided to give her 2 units of packed cells and then  proceed with the D and E.    DESCRIPTION OF PROCEDURE:  The patient was taken to the OR. General  anesthesia was administered and the patient was in the dorsal lithotomy  position, where she did undergo perineal prepping, vaginal prepping, and  drainage of the bladder and appropriate draping. The uterus was  confirmed to be retroverted and there was a large amount of placental  tissue at the cervical os. This was carefully removed with ring forceps  and then a #9 suction curette was used to further clean the uterus of  blood clot and any potential additional placental tissue.   A ring  forceps was used on the anterior lip of the cervix and not a sharp tooth  tenaculum. Of note that the patient did have some bleeding after the  procedure that did respond to uterine massage and an amp of Hemabate and  then the patient was taken to the recovery room in good condition. We  will obtain a hemoglobin prior to her discharge. The patient's blood  type is A positive. She will be discharged home with Mamadou Weinstein as  needed for any postoperative cramping.         Keya Cardenas MD    D: 01/08/2020 10:49:11       T: 01/08/2020 10:56:14     [email or web address]  Job#: [id number]     Doc#: [id number]    CC:

## 2020-01-08 NOTE — ANESTHESIA POSTPROCEDURE EVALUATION
Department of Anesthesiology  Postprocedure Note    Patient: Oneyda Winslow  MRN: [id number]  YOB: 1985  Date of evaluation: 1/8/2020  Time:  12:54 PM     Procedure Summary     Date:  01/08/20 Room / Location:  [address] [place] [address]    Anesthesia Start:  1000 Anesthesia Stop:  7527    Procedure:  DILATATION AND CURETTAGE SUCTION (N/A ) Diagnosis:  (ANEMIA, BLEEDING)    Surgeon:  Akilah Loco MD Responsible Provider:  CYNTHIA Rae - CRNA    Anesthesia Type:  general ASA Status:  3          Anesthesia Type: general    Duncan Phase I: Duncan Score: 10    Duncan Phase II:      Last vitals: Reviewed and per EMR flowsheets.        Anesthesia Post Evaluation    Patient location during evaluation: PACU  Patient participation: complete - patient participated  Level of consciousness: awake and alert  Airway patency: patent  Nausea & Vomiting: no vomiting and no nausea  Complications: no  Cardiovascular status: hemodynamically stable  Respiratory status: room air and spontaneous ventilation  Hydration status: stable

## 2020-01-08 NOTE — ED PROVIDER NOTES
[address] [facility] ED  eMERGENCY dEPARTMENT eNCOUnter      Pt Name: Vesna Kelley  MRN: [id number]  [place] 1985  Date of evaluation: 1/7/2020  Provider: Jeane Alas DO     CHIEF COMPLAINT       Chief Complaint   Patient presents with    Miscarriage     having miscarriage, seen ob today, given pill now lightheaded and dizzy         HISTORY OF PRESENT ILLNESS   (Location/Symptom, Timing/Onset, Context/Setting, Quality, Duration, Modifying Factors, Severity) Note limiting factors. HPI    Vesna Kelley is a 37 y.o. female who presents to the emergency department with complaint of vaginal bleeding. Patient states she is been seen recently secondary to vaginal bleeding during pregnancy. She reports she was informed she is in the process of miscarrying. She states she saw her OB who placed her on a pill to help expel the products of conception. Since that time she has been having heavy vaginal bleeding for the past 5 to 6 hours. She denies any blood thinner use. She states she is now feeling lightheaded and dizzy mainly with changes in position. She states that she has concerned she may need a blood transfusion secondary to the large amount of bleeding and with this presents for evaluation. Nursing Notes were reviewed. REVIEW OF SYSTEMS    (2+ for level 4; 10+ for level 5)   Review of Systems   Constitutional: Negative for chills and fever. HENT: Negative for congestion and sore throat. Respiratory: Negative for cough and shortness of breath. Cardiovascular: Negative for chest pain and palpitations. Gastrointestinal: Positive for abdominal pain. Negative for diarrhea, nausea and vomiting. Genitourinary: Positive for vaginal bleeding. Musculoskeletal: Negative for myalgias. Skin: Negative for color change. Neurological: Positive for dizziness and light-headedness. Negative for headaches. Hematological: Does not bruise/bleed easily.    All other systems reviewed and are negative. PAST MEDICAL HISTORY     Past Medical History:   Diagnosis Date    H/O echocardiogram 4/15/16    Relatively normal. Moderately tachycardia throughout the procedure heart rates in the 120's    History of Holter monitoring 3/10/16    Abnormal. Sinus rhythm with intermittent first degree AV block,average DR 98 bpm. Predominant sinus tachycardia, HR >100 b[, 99% of the test duration.  Hyperthyroidism affecting pregnancy in third trimester 6/20/2016    Pernicious anemia     B12 injections       SURGICAL HISTORY     No past surgical history on file. CURRENT MEDICATIONS       Previous Medications    MISOPROSTOL (CYTOTEC) 200 MCG TABLET    Take 4 tablets by mouth once for 1 dose    PRENATAL VIT-FE FUMARATE-FA (PRENATAL PLUS/IRON) 27-1 MG TABS TABLET    Take 1 tablet by mouth daily       ALLERGIES     Patient has no known allergies.     FAMILY HISTORY       Family History   Problem Relation Age of Onset   Olam Sherry Asthma Mother     Diabetes Mother         IDDM    Hypertension Mother     Stroke Mother     No Known Problems Father     Cancer Maternal Grandmother         Breast    Diabetes Maternal Grandmother         IDDM    Cancer Paternal Grandmother         Breast    Heart Failure Paternal Grandmother         SOCIAL HISTORY       Social History     Socioeconomic History    Marital status:      Spouse name: Not on file    Number of children: Not on file    Years of education: Not on file    Highest education level: Not on file   Occupational History    Not on file   Social Needs    Financial resource strain: Not on file    Food insecurity:     Worry: Not on file     Inability: Not on file    Transportation needs:     Medical: Not on file     Non-medical: Not on file   Tobacco Use    Smoking status: Current Some Day Smoker     Packs/day: 0.25     Types: Cigarettes    Smokeless tobacco: Never Used    Tobacco comment: trying to quit on own   Substance and Sexual Activity    Alcohol use: No     Alcohol/week: 0.0 standard drinks     Comment: rarely    Drug use: No    Sexual activity: Not Currently     Partners: Male   Lifestyle    Physical activity:     Days per week: Not on file     Minutes per session: Not on file    Stress: Not on file   Relationships    Social connections:     Talks on phone: Not on file     Gets together: Not on file     Attends Druze service: Not on file     Active member of club or organization: Not on file     Attends meetings of clubs or organizations: Not on file     Relationship status: Not on file    Intimate partner violence:     Fear of current or ex partner: Not on file     Emotionally abused: Not on file     Physically abused: Not on file     Forced sexual activity: Not on file   Other Topics Concern    Not on file   Social History Narrative    Not on file       SCREENINGS    Freddy Coma Scale  Eye Opening: Spontaneous  Best Verbal Response: Oriented  Best Motor Response: Obeys commands  Freddy Coma Scale Score: 15      PHYSICAL EXAM    (up to 7 for level 4, 8 or more for level 5)   @[email or web address]    Physical Exam  Vitals signs and nursing note reviewed. Constitutional:       General: She is not in acute distress. Appearance: Normal appearance. She is normal weight. She is not ill-appearing, toxic-appearing or diaphoretic. HENT:      Head: Normocephalic and atraumatic. Nose: Nose normal.      Mouth/Throat:      Mouth: Mucous membranes are moist.      Pharynx: Oropharynx is clear. No oropharyngeal exudate or posterior oropharyngeal erythema. Eyes:      General: No scleral icterus. Right eye: No discharge. Left eye: No discharge. Extraocular Movements: Extraocular movements intact. Conjunctiva/sclera: Conjunctivae normal.      Pupils: Pupils are equal, round, and reactive to light. Comments: Positive sub-conjunctiva pallor noted   Neck:      Musculoskeletal: Normal range of motion and neck supple.  No neck rigidity or muscular tenderness. Cardiovascular:      Rate and Rhythm: Regular rhythm. Tachycardia present. Pulses: Normal pulses. Heart sounds: Normal heart sounds. No murmur. No friction rub. No gallop. Comments: Radial pulses are plus 2 out of 4 bilaterally are equal and symmetric  Pulmonary:      Effort: Pulmonary effort is normal. No respiratory distress. Breath sounds: Normal breath sounds. No wheezing or rhonchi. Abdominal:      General: Abdomen is flat. Bowel sounds are normal. There is no distension. Palpations: Abdomen is soft. Tenderness: There is no tenderness. There is no guarding. Genitourinary:     Comments: Patient deferred  Musculoskeletal: Normal range of motion. General: No swelling, tenderness, deformity or signs of injury. Lymphadenopathy:      Cervical: No cervical adenopathy. Skin:     General: Skin is warm and dry. Capillary Refill: Capillary refill takes less than 2 seconds. Coloration: Skin is pale. Findings: No erythema or rash. Comments: Skin turgor is slightly increased   Neurological:      General: No focal deficit present. Mental Status: She is alert and oriented to person, place, and time. Cranial Nerves: No cranial nerve deficit. Sensory: No sensory deficit. Psychiatric:         Mood and Affect: Mood normal.         Behavior: Behavior normal.         Thought Content: Thought content normal.         Judgment: Judgment normal.         DIAGNOSTIC RESULTS     EKG (Per Emergency Physician):       RADIOLOGY (Per Emergency Physician): Interpretation per the Radiologist below, if available at the time of this note:  No results found.     ED BEDSIDE ULTRASOUND:   Performed by ED Physician - none    LABS:  Labs Reviewed   CBC WITH AUTO DIFFERENTIAL - Abnormal; Notable for the following components:       Result Value    RBC 2.74 (*)     Hemoglobin 6.7 (*)     Hematocrit 21.8 (*)     MCV 79.6 (*)     [facility] 24.5 (*)     RDW 16.6 (*)     Seg Neutrophils 69 (*)     Lymphocytes 20 (*)     Immature Granulocytes 2 (*)     All other components within normal limits   BASIC METABOLIC PANEL W/ REFLEX TO MG FOR LOW K - Abnormal; Notable for the following components:    Glucose 111 (*)     Bun/Cre Ratio 22 (*)     Sodium 132 (*)     Potassium 3.4 (*)     Chloride 96 (*)     All other components within normal limits   HCG, QUANTITATIVE, PREGNANCY - Abnormal; Notable for the following components:    hCG Quant 5,009 (*)     All other components within normal limits   PROTIME-INR   APTT   MAGNESIUM   TYPE AND SCREEN   TYPE AND SCREEN   PREPARE RBC (CROSSMATCH)        All other labs were within normal range or not returned as of this dictation. EMERGENCY DEPARTMENT COURSE and DIFFERENTIAL DIAGNOSIS/MDM:   Vitals:    Vitals:    01/07/20 2200 01/07/20 2257   BP: 112/74 96/65   Pulse: 122 96   Resp: 18    Temp: 97.8 °F (36.6 °C)    SpO2: 100%    Weight: 130 lb (59 kg)    Height: 5' 6\" (1.676 m)        Medications   0.9 % sodium chloride bolus (has no administration in time range)   0.9 % sodium chloride bolus (0 mLs Intravenous Stopped 1/7/20 2325)       MDM. Patient arrived to the ER tachycardic. She had 2 recent ultrasounds confirming spontaneous fetal demise/miscarriage. However with her tachycardia and report of worsening bleeding there is concern she would be anemic and needed blood transfusion. Basic labs were obtained which showed the hemoglobin has dropped from 8.8 on January 2 to 6.7 today. Orthostatic vital signs were also positive which is consistent with her history of dizziness when standing. With the persistent bleeding and dropping of the hemoglobin I contacted OB/GYN. At this time they recommend patient be transfused with 2 units of blood and admitted to the hospital.  They will plan on performing a D&C in the morning as long as patient remained stable.      REVAL:         CRITICAL CARE TIME   Total Critical Care time was minutes, excluding separately reportable procedures. There was a high probability of clinically significant/life threatening deterioration in the patient's condition which required my urgent intervention. CONSULTS:  None    PROCEDURES:  Unless otherwise noted below, none     Procedures    FINAL IMPRESSION      1. Acute blood loss anemia    2. Miscarriage          DISPOSITION/PLAN   DISPOSITION Decision To Admit 01/07/2020 11:24:25 PM      PATIENT REFERRED TO:  No follow-up provider specified. DISCHARGE MEDICATIONS:  New Prescriptions    No medications on file          (Please note:  Portions of this note were completed with a voice recognition program.  Efforts were made to edit the dictations but occasionally words and phrases are mis-transcribed.)  Form v2016. J.5-cn    Krysta Patricia DO (electronically signed)  Emergency Medicine Provider        DO Juliette  01/07/20 1432

## 2020-01-08 NOTE — PROGRESS NOTES
Observed patient for first 15 minute of blood transfusion. No signs or symptoms of a transfusion reaction. Will continue to monitor.

## 2020-01-08 NOTE — FLOWSHEET NOTE
Tolerated diet. States pain # 1 at present. Up to bathroom. Small amount of red vaginal bleeding noted on dori pad. Ambulated in halls with assist. Denies dizziness. IV's D/C'd. Tolerated well. Dressing to go home.

## 2020-01-08 NOTE — PROGRESS NOTES
Dr. Sylvia Tyson called at this time with admission orders. See new orders. States he will plan to perform a D&C on the patient tomorrow morning if pt's bleeding does not increase overnight.

## 2020-01-08 NOTE — FLOWSHEET NOTE
Second unit of PRBCs started Thank you for coming to your appointment today. Please make sure to do the following:  - Get labs drawn  - Schedule your appointments in 2 months  - Continue the medications as listed    If your symptoms worsen or do not improve, call for a sooner appointment or call [phone] for the nurse's line.     Jany Mahan MD

## 2020-01-08 NOTE — FLOWSHEET NOTE
Returned from [place]. Vitals checked and assessment done. Janessa pad changed for moderate amount of red vaginal drainage. Continue to monitor drainage.

## 2020-01-08 NOTE — PROGRESS NOTES
Nutrition Assessment    Type and Reason for Visit: Initial, Patient Education    Nutrition Recommendations:  Iron rich diet    Nutrition Assessment: Increased nutrient needs r/t alteration in GI function, AEB patient report of decreased iron absorption. Provided with high iron MNT with encouragement of vitamin C source for non-heme iron foods. Spontaneous , pending d&c. Denies ingestion or appetite issues pta. Malnutrition Assessment:  · Malnutrition Status: No malnutrition  · Context: Acute illness or injury  · Findings of the 6 clinical characteristics of malnutrition (Minimum of 2 out of 6 clinical characteristics is required to make the diagnosis of moderate or severe Protein Calorie Malnutrition based on AND/ASPEN Guidelines):  1. Energy Intake-Less than or equal to 50% of estimated energy requirement, (Last 24 hours)    2. Weight Loss-No significant weight loss,    3. Fat Loss-No significant subcutaneous fat loss,    4. Muscle Loss-No significant muscle mass loss,    5. Fluid Accumulation-No significant fluid accumulation,    6.  Strength-Not measured    Nutrition Risk Level:  Moderate, Low    Nutrition Diagnosis:   · Problem: Increased nutrient needs(iron)  · Etiology: related to Alteration in GI function     Signs and symptoms:  as evidenced by Patient report of(poor iron absorption)    Objective Information:  · Nutrition-Focused Physical Findings: thin  · Wound Type: None  · Current Nutrition Therapies:  · Oral Diet Orders: NPO   · Oral Diet intake: NPO  · Oral Nutrition Supplement (ONS) Orders: None  · Anthropometric Measures:  · Ht: 5' 6\" (167.6 cm)   · Current Body Wt: 130 lb 6.4 oz (59.1 kg)  · Admission Body Wt: 130 lb (59 kg)  · Usual Body Wt: 130 lb (59 kg)  · % Weight Change:  ,  none  · Ideal Body Wt: 130 lb (59 kg), % Ideal Body 100%  · BMI Classification: BMI 18.5 - 24.9 Normal Weight    Lab Results   Component Value Date     (L) 2020    K 3.4 (L) 2020    CL 96 (L) 01/07/2020    CO2 21 01/07/2020    BUN 12 01/07/2020    CREATININE 0.54 01/07/2020    GLUCOSE 111 (H) 01/07/2020    CALCIUM 9.1 01/07/2020    PROT 7.0 01/02/2020    LABALBU 4.2 01/02/2020    BILITOT 0.19 (L) 01/02/2020    ALKPHOS 70 01/02/2020    AST 15 01/02/2020    ALT 5 01/02/2020    LABGLOM >60 01/07/2020    GFRAA >60 01/07/2020     Nutrition Interventions:   Start oral diet  Continued Inpatient Monitoring, Coordination of Care, Education Initiated    Nutrition Evaluation:   · Evaluation: Goals set   · Goals: PO >75% meals with high iron choices    · Monitoring: Meal Intake, Pertinent Labs, Weight, I&O, Patient/Family Education    Electronically signed by Dina Cordero RD, LD on 1/8/20 at 8:48 AM    Contact Number: [phone]

## 2020-01-08 NOTE — PROGRESS NOTES
Pt arrives to [facility] via wheelchair. Ambulated to bed with slight dizziness upon standing. Vital signs and assessment done at this time. Pt denies any pain or SOB. States vaginal bleeding has slowed down since being at hospital. Lung sounds are clear throughout and bowel sounds active in all quadrants. Heart sounds are regular and no edema noted. Pt has flat affect and  is staying the night with patient. Pt is resting comfortably with call light and belongings with in reach. Will continue to monitor.

## 2020-01-08 NOTE — PROGRESS NOTES
Called Dr. Alize Givens to update on H&H. States patient is able to be discharged today and follow up in his office.

## 2020-01-08 NOTE — DISCHARGE INSTR - DIET

## 2020-01-09 LAB — SURGICAL PATHOLOGY REPORT: NORMAL

## 2020-01-10 ENCOUNTER — HOSPITAL ENCOUNTER (OUTPATIENT)
Age: 35
Discharge: HOME | End: 2020-01-10
Payer: MEDICAID

## 2020-01-10 LAB
ABSOLUTE EOS #: 0.13 K/UL (ref 0–0.44)
ABSOLUTE IMMATURE GRANULOCYTE: 0.13 K/UL (ref 0–0.3)
ABSOLUTE LYMPH #: 2.06 K/UL (ref 1.1–3.7)
ABSOLUTE MONO #: 0.62 K/UL (ref 0.1–1.2)
ANION GAP SERPL CALCULATED.3IONS-SCNC: 12 MMOL/L (ref 9–17)
BASOPHILS # BLD: 1 % (ref 0–2)
BASOPHILS ABSOLUTE: 0.05 K/UL (ref 0–0.2)
BUN BLDV-MCNC: 12 MG/DL (ref 6–20)
BUN/CREAT BLD: 24 (ref 9–20)
CALCIUM SERPL-MCNC: 8.8 MG/DL (ref 8.6–10.4)
CHLORIDE BLD-SCNC: 105 MMOL/L (ref 98–107)
CO2: 21 MMOL/L (ref 20–31)
CREAT SERPL-MCNC: 0.49 MG/DL (ref 0.5–0.9)
DIFFERENTIAL TYPE: ABNORMAL
EOSINOPHILS RELATIVE PERCENT: 2 % (ref 1–4)
FERRITIN: 4 UG/L (ref 13–150)
GFR AFRICAN AMERICAN: >60 ML/MIN
GFR NON-AFRICAN AMERICAN: >60 ML/MIN
GFR SERPL CREATININE-BSD FRML MDRD: ABNORMAL ML/MIN/{1.73_M2}
GFR SERPL CREATININE-BSD FRML MDRD: ABNORMAL ML/MIN/{1.73_M2}
GLUCOSE BLD-MCNC: 91 MG/DL (ref 70–99)
HCT VFR BLD CALC: 24.1 % (ref 36.3–47.1)
HEMOGLOBIN: 7.3 G/DL (ref 11.9–15.1)
IMMATURE GRANULOCYTES: 2 %
IRON SATURATION: 3 % (ref 20–55)
IRON: 10 UG/DL (ref 37–145)
LYMPHOCYTES # BLD: 25 % (ref 24–43)
MCH RBC QN AUTO: 25 PG (ref 25.2–33.5)
MCHC RBC AUTO-ENTMCNC: 30.3 G/DL (ref 28.4–34.8)
MCV RBC AUTO: 82.5 FL (ref 82.6–102.9)
MONOCYTES # BLD: 7 % (ref 3–12)
NRBC AUTOMATED: 0 PER 100 WBC
PDW BLD-RTO: 16.4 % (ref 11.8–14.4)
PLATELET # BLD: 240 K/UL (ref 138–453)
PLATELET ESTIMATE: ABNORMAL
PMV BLD AUTO: 9.8 FL (ref 8.1–13.5)
POTASSIUM SERPL-SCNC: 4.1 MMOL/L (ref 3.7–5.3)
RBC # BLD: 2.92 M/UL (ref 3.95–5.11)
RBC # BLD: ABNORMAL 10*6/UL
SEG NEUTROPHILS: 63 % (ref 36–65)
SEGMENTED NEUTROPHILS ABSOLUTE COUNT: 5.36 K/UL (ref 1.5–8.1)
SODIUM BLD-SCNC: 138 MMOL/L (ref 135–144)
TOTAL IRON BINDING CAPACITY: 318 UG/DL (ref 250–450)
UNSATURATED IRON BINDING CAPACITY: 308 UG/DL (ref 112–347)
WBC # BLD: 8.4 K/UL (ref 3.5–11.3)
WBC # BLD: ABNORMAL 10*3/UL

## 2020-01-10 PROCEDURE — 82607 VITAMIN B-12: CPT

## 2020-01-10 PROCEDURE — 82746 ASSAY OF FOLIC ACID SERUM: CPT

## 2020-01-10 PROCEDURE — 83550 IRON BINDING TEST: CPT

## 2020-01-10 PROCEDURE — 85025 COMPLETE CBC W/AUTO DIFF WBC: CPT

## 2020-01-10 PROCEDURE — 36415 COLL VENOUS BLD VENIPUNCTURE: CPT

## 2020-01-10 PROCEDURE — 83540 ASSAY OF IRON: CPT

## 2020-01-10 PROCEDURE — 82728 ASSAY OF FERRITIN: CPT

## 2020-01-10 PROCEDURE — 80048 BASIC METABOLIC PNL TOTAL CA: CPT

## 2020-01-11 LAB
FOLATE: 14.4 NG/ML
VITAMIN B-12: 197 PG/ML (ref 232–1245)

## 2020-01-13 ENCOUNTER — OFFICE VISIT (OUTPATIENT)
Dept: OBGYN | Age: 35
End: 2020-01-13
Payer: MEDICAID

## 2020-01-13 VITALS
BODY MASS INDEX: 21.21 KG/M2 | HEIGHT: 66 IN | SYSTOLIC BLOOD PRESSURE: 146 MMHG | DIASTOLIC BLOOD PRESSURE: 80 MMHG | WEIGHT: 132 LBS

## 2020-01-13 PROCEDURE — G8420 CALC BMI NORM PARAMETERS: HCPCS | Performed by: ADVANCED PRACTICE MIDWIFE

## 2020-01-13 PROCEDURE — 1111F DSCHRG MED/CURRENT MED MERGE: CPT | Performed by: ADVANCED PRACTICE MIDWIFE

## 2020-01-13 PROCEDURE — G8484 FLU IMMUNIZE NO ADMIN: HCPCS | Performed by: ADVANCED PRACTICE MIDWIFE

## 2020-01-13 PROCEDURE — 4004F PT TOBACCO SCREEN RCVD TLK: CPT | Performed by: ADVANCED PRACTICE MIDWIFE

## 2020-01-13 PROCEDURE — 99024 POSTOP FOLLOW-UP VISIT: CPT | Performed by: ADVANCED PRACTICE MIDWIFE

## 2020-01-13 PROCEDURE — G8427 DOCREV CUR MEDS BY ELIG CLIN: HCPCS | Performed by: ADVANCED PRACTICE MIDWIFE

## 2020-01-13 ASSESSMENT — PATIENT HEALTH QUESTIONNAIRE - PHQ9
SUM OF ALL RESPONSES TO PHQ9 QUESTIONS 1 & 2: 0
SUM OF ALL RESPONSES TO PHQ QUESTIONS 1-9: 0
1. LITTLE INTEREST OR PLEASURE IN DOING THINGS: 0
2. FEELING DOWN, DEPRESSED OR HOPELESS: 0
SUM OF ALL RESPONSES TO PHQ QUESTIONS 1-9: 0

## 2020-01-13 NOTE — PROGRESS NOTES
PROBLEM VISIT     Date of service: 2020    Saurav Mccray  Is a 37 y.o.  female    PT's PCP is: Ester Thompson MD     : 1985                                             Subjective:       Patient's last menstrual period was 10/30/2019 (exact date). OB History    Para Term  AB Living   6 4 4   1 4   SAB TAB Ectopic Molar Multiple Live Births   1       0 4      # Outcome Date GA Lbr Javier/2nd Weight Sex Delivery Anes PTL Lv   6 Current            5 SAB 19 4w0d    SAB      4 Term 18 38w4d / 00:12 6 lb 9.4 oz (2.988 kg) F Vag-Spont EPI N RATNA   3 Term 16 39w0d  7 lb 0.2 oz (3.181 kg) M Vag-Spont   RATNA   2 Term 10/29/09 40w0d  7 lb 4 oz (3.289 kg) F Vag-Spont None N RATNA   1 Term 10/23/03 40w0d  7 lb 7 oz (3.374 kg) F Vag-Spont None N RATNA        Social History     Tobacco Use   Smoking Status Current Some Day Smoker    Packs/day: 0.25    Types: Cigarettes   Smokeless Tobacco Never Used   Tobacco Comment    trying to quit on own        Social History     Substance and Sexual Activity   Alcohol Use No    Alcohol/week: 0.0 standard drinks    Comment: rarely       Social History     Substance and Sexual Activity   Sexual Activity Not Currently    Partners: Male       Allergies: Patient has no known allergies. Chief Complaint   Patient presents with    Miscarriage     pt had a D&E on 2020. pt states she is not doing much better       Last Yearly:  2016    Last pap: 2016    Last HPV: pt states never    DEPRESSION SCREEN SCORE: 0    PE:  Vital Signs  Blood pressure (!) 150/80, height 5' 6\" (1.676 m), weight 132 lb (59.9 kg), last menstrual period 10/30/2019, not currently breastfeeding. NURSE: RAFI    HPI: Patient states she is very fatigued and tired. Patient is waiting for her specialist to call her back with the plan. Patient states she did have a DNE and emotionally she is doing fine.   Yes  PT denies fever, chills, nausea and vomiting Results reviewed today:    Results for Yudi Wong (MRN [id number]) as of 1/13/2020 15:57   Ref.  Range 1/10/2020 13:06   Sodium Latest Ref Range: 135 - 144 mmol/L 138   Potassium Latest Ref Range: 3.7 - 5.3 mmol/L 4.1   Chloride Latest Ref Range: 98 - 107 mmol/L 105   CO2 Latest Ref Range: 20 - 31 mmol/L 21   BUN Latest Ref Range: 6 - 20 mg/dL 12   Creatinine Latest Ref Range: 0.50 - 0.90 mg/dL 0.49 (L)   Bun/Cre Ratio Latest Ref Range: 9 - 20  24 (H)   Anion Gap Latest Ref Range: 9 - 17 mmol/L 12   GFR Non- Latest Ref Range: >60 mL/min >60   GFR African American Latest Ref Range: >60 mL/min >60   Glucose Latest Ref Range: 70 - 99 mg/dL 91   Calcium Latest Ref Range: 8.6 - 10.4 mg/dL 8.8   GFR Comment Unknown Pend   GFR Staging Unknown Pend   WBC Latest Ref Range: 3.5 - 11.3 k/uL 8.4   RBC Latest Ref Range: 3.95 - 5.11 m/uL 2.92 (L)   Hemoglobin Quant Latest Ref Range: 11.9 - 15.1 g/dL 7.3 (L)   Hematocrit Latest Ref Range: 36.3 - 47.1 % 24.1 (L)   MCV Latest Ref Range: 82.6 - 102.9 fL 82.5 (L)   MCH Latest Ref Range: 25.2 - 33.5 pg 25.0 (L)   MCHC Latest Ref Range: 28.4 - 34.8 g/dL 30.3   MPV Latest Ref Range: 8.1 - 13.5 fL 9.8   RDW Latest Ref Range: 11.8 - 14.4 % 16.4 (H)   Platelet Count Latest Ref Range: 138 - 453 k/uL 240   Platelet Estimate Unknown NOT REPORTED   Absolute Mono # Latest Ref Range: 0.10 - 1.20 k/uL 0.62   Eosinophils % Latest Ref Range: 1 - 4 % 2   Basophils Absolute Latest Ref Range: 0.00 - 0.20 k/uL 0.05   Differential Type Unknown NOT REPORTED   Seg Neutrophils Latest Ref Range: 36 - 65 % 63   Segs Absolute Latest Ref Range: 1.50 - 8.10 k/uL 5.36   Lymphocytes Latest Ref Range: 24 - 43 % 25   Absolute Lymph # Latest Ref Range: 1.10 - 3.70 k/uL 2.06   Monocytes Latest Ref Range: 3 - 12 % 7   Absolute Eos # Latest Ref Range: 0.00 - 0.44 k/uL 0.13   Basophils Latest Ref Range: 0 - 2 % 1   Immature Granulocytes Latest Ref Range: 0 % 2 (H)   WBC Morphology Unknown NOT REPORTED   RBC Morphology Unknown NOT REPORTED   Ferritin Latest Ref Range: 13 - 150 ug/L 4 (L)   Iron Latest Ref Range: 37 - 145 ug/dL 10 (L)   Iron Saturation Latest Ref Range: 20 - 55 % 3 (L)   UIBC Latest Ref Range: 112 - 347 ug/dL 308   TIBC Latest Ref Range: 250 - 450 ug/dL 318   Folate Latest Ref Range: >4.8 ng/mL 14.4   Vitamin B-12 Latest Ref Range: 232 - 1245 pg/mL 197 (L)   Absolute Immature Granulocyte Latest Ref Range: 0.00 - 0.30 k/uL 0.13   NRBC Automated Latest Ref Range: 0.0 per 100 WBC 0.0                                                    Assessment and Plan          Diagnosis Orders   1. SAB (spontaneous )     2. Megaloblastic anemia due to vitamin B12 deficiency     3. Acute blood loss anemia               I am having Soni Browne maintain her PRENATAL PLUS/IRON. No follow-ups on file. She was also counseled on her preventative health maintenance recommendations and follow-up. There are no Patient Instructions on file for this visit.     Dori Cespedes,2020 3:59 PM

## 2020-01-14 LAB
ABO/RH: NORMAL
ANTIBODY IDENTIFICATION: NORMAL
ANTIBODY SCREEN: POSITIVE
ANTIGEN TYPE, PATIENT: NORMAL
ARM BAND NUMBER: NORMAL
BLD PROD TYP BPU: NORMAL
BLD PROD TYP BPU: NORMAL
CROSSMATCH RESULT: NORMAL
CROSSMATCH RESULT: NORMAL
DISPENSE STATUS BLOOD BANK: NORMAL
DISPENSE STATUS BLOOD BANK: NORMAL
EXPIRATION DATE: NORMAL
TRANSFUSION STATUS: NORMAL
TRANSFUSION STATUS: NORMAL
UNIT DIVISION: 0
UNIT DIVISION: 0
UNIT NUMBER: NORMAL
UNIT NUMBER: NORMAL

## 2020-06-25 ENCOUNTER — OFFICE VISIT (OUTPATIENT)
Dept: OBGYN | Age: 35
End: 2020-06-25
Payer: MEDICAID

## 2020-06-25 VITALS
DIASTOLIC BLOOD PRESSURE: 62 MMHG | BODY MASS INDEX: 20.09 KG/M2 | WEIGHT: 128 LBS | HEIGHT: 67 IN | SYSTOLIC BLOOD PRESSURE: 112 MMHG

## 2020-06-25 PROCEDURE — 4004F PT TOBACCO SCREEN RCVD TLK: CPT | Performed by: ADVANCED PRACTICE MIDWIFE

## 2020-06-25 PROCEDURE — G8420 CALC BMI NORM PARAMETERS: HCPCS | Performed by: ADVANCED PRACTICE MIDWIFE

## 2020-06-25 PROCEDURE — G8427 DOCREV CUR MEDS BY ELIG CLIN: HCPCS | Performed by: ADVANCED PRACTICE MIDWIFE

## 2020-06-25 PROCEDURE — 99213 OFFICE O/P EST LOW 20 MIN: CPT | Performed by: ADVANCED PRACTICE MIDWIFE

## 2020-06-25 RX ORDER — NORGESTIMATE AND ETHINYL ESTRADIOL 0.25-0.035
1 KIT ORAL DAILY
Qty: 1 PACKET | Refills: 3 | Status: SHIPPED | OUTPATIENT
Start: 2020-06-25 | End: 2020-10-29 | Stop reason: SDUPTHER

## 2020-06-25 ASSESSMENT — PATIENT HEALTH QUESTIONNAIRE - PHQ9
1. LITTLE INTEREST OR PLEASURE IN DOING THINGS: 0
SUM OF ALL RESPONSES TO PHQ QUESTIONS 1-9: 0
SUM OF ALL RESPONSES TO PHQ QUESTIONS 1-9: 0
SUM OF ALL RESPONSES TO PHQ9 QUESTIONS 1 & 2: 0
2. FEELING DOWN, DEPRESSED OR HOPELESS: 0

## 2020-06-25 NOTE — PROGRESS NOTES
PROBLEM VISIT     Date of service: 2020    Dakota Brock  Is a 37 y.o.  female    PT's PCP is: Kary Mcdowell MD     : 1985                                             Subjective:       Patient's last menstrual period was 2020 (exact date). OB History    Para Term  AB Living   7 4 4   3 4   SAB TAB Ectopic Molar Multiple Live Births   3       0 4      # Outcome Date GA Lbr Javier/2nd Weight Sex Delivery Anes PTL Lv   7 SAB 20 6w0d    SAB      6 SAB 19 4w0d    SAB      5 Term 18 38w4d / 00:12 6 lb 9.4 oz (2.988 kg) F Vag-Spont EPI N RATNA   4 Term 16 39w0d  7 lb 0.2 oz (3.181 kg) M Vag-Spont   RATNA   3 Term 10/29/09 40w0d  7 lb 4 oz (3.289 kg) F Vag-Spont None N RATNA   2 SAB 2008 6w0d    SAB      1 Term 10/23/03 40w0d  7 lb 7 oz (3.374 kg) F Vag-Spont None N RATNA        Social History     Tobacco Use   Smoking Status Current Some Day Smoker    Packs/day: 0.25    Types: Cigarettes   Smokeless Tobacco Never Used   Tobacco Comment    trying to quit on own        Social History     Substance and Sexual Activity   Alcohol Use No    Alcohol/week: 0.0 standard drinks    Comment: rarely       Social History     Substance and Sexual Activity   Sexual Activity Yes    Partners: Male    Comment: condoms        Allergies: Patient has no known allergies. Chief Complaint   Patient presents with    Menstrual Problem     pt would like to discuss cycle control options. she states her periods are heavy. pt declines std testing        Last Yearly:      Last pap: 16    Last HPV: never    PE:  Vital Signs  Blood pressure 112/62, height 5' 7\" (1.702 m), weight 128 lb (58.1 kg), last menstrual period 2020, not currently breastfeeding. NURSE: RAFI    HPI: She states she is having periods once a month however they are super heavy. Patient states that the medication she has tried are progesterone only medications.   Patient states with those

## 2020-07-16 ENCOUNTER — APPOINTMENT (OUTPATIENT)
Dept: GENERAL RADIOLOGY | Age: 35
End: 2020-07-16
Payer: COMMERCIAL

## 2020-07-16 ENCOUNTER — HOSPITAL ENCOUNTER (EMERGENCY)
Age: 35
Discharge: HOME | End: 2020-07-16
Attending: EMERGENCY MEDICINE
Payer: COMMERCIAL

## 2020-07-16 VITALS
RESPIRATION RATE: 18 BRPM | DIASTOLIC BLOOD PRESSURE: 72 MMHG | HEART RATE: 87 BPM | SYSTOLIC BLOOD PRESSURE: 120 MMHG | OXYGEN SATURATION: 98 % | TEMPERATURE: 97.9 F

## 2020-07-16 PROCEDURE — 71045 X-RAY EXAM CHEST 1 VIEW: CPT

## 2020-07-16 PROCEDURE — 6370000000 HC RX 637 (ALT 250 FOR IP): Performed by: EMERGENCY MEDICINE

## 2020-07-16 PROCEDURE — 99283 EMERGENCY DEPT VISIT LOW MDM: CPT

## 2020-07-16 RX ORDER — ALBUTEROL SULFATE 90 UG/1
2 AEROSOL, METERED RESPIRATORY (INHALATION) ONCE
Status: COMPLETED | OUTPATIENT
Start: 2020-07-16 | End: 2020-07-16

## 2020-07-16 RX ORDER — AZITHROMYCIN 250 MG/1
TABLET, FILM COATED ORAL
Qty: 1 PACKET | Refills: 0 | Status: SHIPPED | OUTPATIENT
Start: 2020-07-16 | End: 2020-07-26

## 2020-07-16 RX ORDER — ALBUTEROL SULFATE 90 UG/1
1-2 AEROSOL, METERED RESPIRATORY (INHALATION) EVERY 4 HOURS PRN
Qty: 1 INHALER | Refills: 0 | Status: SHIPPED | OUTPATIENT
Start: 2020-07-16

## 2020-07-16 RX ORDER — PREDNISONE 20 MG/1
60 TABLET ORAL ONCE
Status: COMPLETED | OUTPATIENT
Start: 2020-07-16 | End: 2020-07-16

## 2020-07-16 RX ORDER — AZITHROMYCIN 250 MG/1
500 TABLET, FILM COATED ORAL ONCE
Status: COMPLETED | OUTPATIENT
Start: 2020-07-16 | End: 2020-07-16

## 2020-07-16 RX ORDER — PREDNISONE 10 MG/1
TABLET ORAL
Qty: 20 TABLET | Refills: 0 | Status: SHIPPED | OUTPATIENT
Start: 2020-07-16 | End: 2020-07-26

## 2020-07-16 RX ADMIN — AZITHROMYCIN MONOHYDRATE 500 MG: 250 TABLET ORAL at 05:49

## 2020-07-16 RX ADMIN — PREDNISONE 60 MG: 20 TABLET ORAL at 05:49

## 2020-07-16 RX ADMIN — ALBUTEROL SULFATE 2 PUFF: 90 AEROSOL, METERED RESPIRATORY (INHALATION) at 05:50

## 2020-07-16 ASSESSMENT — ENCOUNTER SYMPTOMS
TROUBLE SWALLOWING: 0
VOICE CHANGE: 0
NAUSEA: 0
SORE THROAT: 0
VOMITING: 0
WHEEZING: 1
COLOR CHANGE: 0
SINUS PAIN: 1
SHORTNESS OF BREATH: 0
ABDOMINAL PAIN: 0
SINUS PRESSURE: 1
COUGH: 1
RHINORRHEA: 1

## 2020-07-16 NOTE — ED PROVIDER NOTES
[address] [place] [address] ED  Emergency Department Encounter  EmergencyMedicine Attending     Pt Name:Vera Meehan  MRN: [id number]  [place] 1985  Date of evaluation: 7/16/20  PCP:  Lindy Figueroa MD    [address] [place] [address]       Chief Complaint   Patient presents with    Cough     onset sun    Sinusitis     worsening yesterday       HISTORY OF PRESENT ILLNESS  (Location/Symptom, Timing/Onset, Context/Setting, Quality, Duration, Modifying Factors, Severity.)      Noreen Ford is a 37 y.o. female who presents with 5-6 days of cough, congestion, sinus pain, purulent phlegm as well as sinus drainage who presents to the emergency department. No nausea vomiting, no chest pain shortness of breath difficulty breathing. Has a history of multiple bronchitis episodes in the past and says that it feels like a similar episode. No fevers or chills, no some mild sinus pain over the left maxillary sinus, no radiations, sharp pain. Patient reports that initially she was getting better. However over the last couple of days she got much worse. PAST MEDICAL / SURGICAL / SOCIAL / FAMILY HISTORY      has a past medical history of H/O echocardiogram, History of Holter monitoring, Hyperthyroidism affecting pregnancy in third trimester, and Pernicious anemia. has a past surgical history that includes Dilation and curettage of uterus (01/08/2020) and Dilation and curettage of uterus (N/A, 1/8/2020).     Social History     Socioeconomic History    Marital status:      Spouse name: Not on file    Number of children: Not on file    Years of education: Not on file    Highest education level: Not on file   Occupational History    Not on file   Social Needs    Financial resource strain: Not on file    Food insecurity     Worry: Not on file     Inability: Not on file    Transportation needs     Medical: Not on file     Non-medical: Not on file   Tobacco Use    Smoking status: Current Every Day Smoker Packs/day: 0.25     Types: Cigarettes    Smokeless tobacco: Never Used    Tobacco comment: trying to quit on own   Substance and Sexual Activity    Alcohol use: No     Alcohol/week: 0.0 standard drinks     Comment: rarely    Drug use: No    Sexual activity: Yes     Partners: Male     Comment: condoms    Lifestyle    Physical activity     Days per week: Not on file     Minutes per session: Not on file    Stress: Not on file   Relationships    Social connections     Talks on phone: Not on file     Gets together: Not on file     Attends [employer] service: Not on file     Active member of club or organization: Not on file     Attends meetings of clubs or organizations: Not on file     Relationship status: Not on file    Intimate partner violence     Fear of current or ex partner: Not on file     Emotionally abused: Not on file     Physically abused: Not on file     Forced sexual activity: Not on file   Other Topics Concern    Not on file   Social History Narrative    Not on file       Family History   Problem Relation Age of Onset    Asthma Mother     Diabetes Mother         IDDM    Hypertension Mother     Stroke Mother     No Known Problems Father     Cancer Maternal Grandmother         Breast    Diabetes Maternal Grandmother         IDDM    Cancer Paternal Grandmother         Breast    Heart Failure Paternal Grandmother        Allergies:  Patient has no known allergies. Home Medications:  Prior to Admission medications    Medication Sig Start Date End Date Taking?  Authorizing Provider   azithromycin (ZITHROMAX) 250 MG tablet Take 2 tablets (500 mg) on Day 1, followed by 1 tablet (250 mg) once daily on Days 2 through 5. 7/16/20 7/26/20 Yes Franchesca Jackson MD   albuterol sulfate HFA (PROVENTIL HFA) 108 (90 Base) MCG/ACT inhaler Inhale 1-2 puffs into the lungs every 4 hours as needed for Wheezing or Shortness of Breath (Space out to every 6 hours as symptoms improve) Space out to every 6 hours as symptoms improve. 7/16/20  Yes Nabeel Dasilva MD   predniSONE (DELTASONE) 10 MG tablet Take 4 tablets by mouth once daily for 5 days 7/16/20 7/26/20 Yes Nabeel Dasilva MD   Cyanocobalamin (B-12 IJ) Inject as directed   Yes Historical Provider, MD   norgestimate-ethinyl estradiol (ORTHO-CYCLEN, 28,) 0.25-35 MG-MCG per tablet Take 1 tablet by mouth daily 6/25/20  Yes CYNTHIA Simeon CNM   Prenatal Vit-Fe Fumarate-FA (PRENATAL PLUS/IRON) 27-1 MG TABS tablet Take 1 tablet by mouth daily  Patient not taking: Reported on 1/7/2020 3/22/18   CYNTHIA Simeon CNM       REVIEW OF SYSTEMS    (2-9 systems for level 4, 10 or more for level 5)      Review of Systems   Constitutional: Negative for chills and fever. HENT: Positive for congestion, rhinorrhea, sinus pressure and sinus pain. Negative for sore throat, trouble swallowing and voice change. Respiratory: Positive for cough and wheezing. Negative for shortness of breath. Cardiovascular: Negative for chest pain. Gastrointestinal: Negative for abdominal pain, nausea and vomiting. Skin: Negative for color change. Neurological: Negative for weakness and headaches. PHYSICAL EXAM   (up to 7 for level 4, 8 or more for level 5)      INITIAL VITALS:   /72   Pulse 87   Temp 97.9 °F (36.6 °C)   Resp 18   LMP 06/01/2020   SpO2 98%     Physical Exam  Constitutional:       General: She is not in acute distress. Appearance: She is well-developed. HENT:      Head: Normocephalic and atraumatic. Mouth/Throat:      Comments: Mild tenderness palpation over the left maxillary sinus. Eyes:      General:         Right eye: No discharge. Left eye: No discharge. Cardiovascular:      Rate and Rhythm: Normal rate and regular rhythm. Heart sounds: Normal heart sounds. No murmur. No friction rub. No gallop. Pulmonary:      Effort: Pulmonary effort is normal. No respiratory distress.       Breath sounds: Wheezing and rhonchi present. No rales. Comments: Left lung base with some rhonchi and scattered wheezing  Abdominal:      General: There is no distension. Palpations: Abdomen is soft. Tenderness: There is no abdominal tenderness. There is no guarding or rebound. Musculoskeletal:         General: No tenderness. Skin:     General: Skin is warm. Findings: No erythema. Neurological:      Mental Status: She is alert. DIFFERENTIAL  DIAGNOSIS     PLAN (LABS / IMAGING / EKG):  Orders Placed This Encounter   Procedures    XR CHEST PORTABLE       MEDICATIONS ORDERED:  Orders Placed This Encounter   Medications    predniSONE (DELTASONE) tablet 60 mg    azithromycin (ZITHROMAX) tablet 500 mg    albuterol sulfate  (90 Base) MCG/ACT inhaler 2 puff    azithromycin (ZITHROMAX) 250 MG tablet     Sig: Take 2 tablets (500 mg) on Day 1, followed by 1 tablet (250 mg) once daily on Days 2 through 5. Dispense:  1 packet     Refill:  0    albuterol sulfate HFA (PROVENTIL HFA) 108 (90 Base) MCG/ACT inhaler     Sig: Inhale 1-2 puffs into the lungs every 4 hours as needed for Wheezing or Shortness of Breath (Space out to every 6 hours as symptoms improve) Space out to every 6 hours as symptoms improve. Dispense:  1 Inhaler     Refill:  0    predniSONE (DELTASONE) 10 MG tablet     Sig: Take 4 tablets by mouth once daily for 5 days     Dispense:  20 tablet     Refill:  0       DDX: Asthma exacerbation versus COPD versus viral bronchitis versus bacterial bronchitis vs corona virus    DIAGNOSTIC RESULTS / EMERGENCY DEPARTMENT COURSE / MDM   :  No results found for this visit on 07/16/20. IMPRESSION: 24-year-old female who presents with cough, sinus pain, purulent sinus drainage as well as phlegm production. Initial improvement of symptoms however worsening over the last couple of days. Total symptom duration is about 6 days.    given the worsening despite initial improvement, there may be a bacterial component to the symptoms of the patient, will start the patient on a Z-Colby, give prednisone and an albuterol inhaler. I did offer coronavirus testing that patient declined at this time stating that she is off work until her symptoms resolved and she is already in self-isolation for another 2 weeks while she is off work. RADIOLOGY:    Xr Chest Portable    Result Date: 7/16/2020  EXAMINATION: ONE XRAY VIEW OF THE CHEST 7/16/2020 6:15 am COMPARISON: None HISTORY: ORDERING SYSTEM PROVIDED HISTORY: Wheezing and rhonchi L lung base. + Cough, congestion, sinus pain. TECHNOLOGIST PROVIDED HISTORY: Wheezing and rhonchi L lung base. + Cough, congestion, sinus pain. FINDINGS: The cardiomediastinal silhouette is within normal range. Lungs are clear. There is no focal pulmonary consolidation, pleural effusion, pneumothorax, or evidence of airspace pulmonary edema. 1. No radiographic finding to account for patient's cough and congestion. EKG  None    All EKG's are interpreted by the Emergency Department Physician who either signs or Co-signs this chart in the absence of a cardiologist.    EMERGENCY DEPARTMENT COURSE:    X-ray was unremarkable, plan to discharge, patient will continue self-isolation, return to ER with worsening symptoms. PROCEDURES:  None    CONSULTS:  None    CRITICAL CARE:  None    FINAL IMPRESSION      1.  Acute bacterial bronchitis          DISPOSITION / PLAN     DISPOSITION Decision To Discharge 07/16/2020 06:55:21 AM      PATIENT REFERRED TO:  Miriam Mccauley MD  [address] [place] [address]  [phone]    Call in 2 days        DISCHARGE MEDICATIONS:  Discharge Medication List as of 7/16/2020  6:26 AM      START taking these medications    Details   azithromycin (ZITHROMAX) 250 MG tablet Take 2 tablets (500 mg) on Day 1, followed by 1 tablet (250 mg) once daily on Days 2 through 5., Disp-1 packet,R-0Print      albuterol sulfate HFA (PROVENTIL HFA) 108 (90 Base) MCG/ACT inhaler Inhale 1-2 puffs into the lungs every 4 hours as needed for Wheezing or Shortness of Breath (Space out to every 6 hours as symptoms improve) Space out to every 6 hours as symptoms improve., Disp-1 Inhaler,R-0Print      predniSONE (DELTASONE) 10 MG tablet Take 4 tablets by mouth once daily for 5 days, Disp-20 tablet,R-0Print             Zhanna Dee MD  Emergency Medicine Attending    (Please note that portions of thisnote were completed with a voice recognition program.  Efforts were made to edit the dictations but occasionally words are mis-transcribed.)       Zhanna Dee MD  07/17/20 [id number]

## 2020-07-17 ENCOUNTER — CARE COORDINATION (OUTPATIENT)
Dept: CARE COORDINATION | Age: 35
End: 2020-07-17

## 2020-07-17 NOTE — CARE COORDINATION
Reason For Call Today:  [phone]  -2nd attempt to reach [facility] today for ED Follow Up/ COVID at risk monitoring following her ED visit to [facility] on 7/16 for bronchitis. -Unable to reach [facility] today     No further outreach scheduled with this CTN/ACM. Episode of Care resolved. Patient has this CTN/ACM contact information if future needs arise.

## 2020-07-17 NOTE — CARE COORDINATION
Reason For Call Today:  [phone]  -Attempted to reach [facility] today for ED Follow Up/ COVID at risk monitoring following her ED visit to [facility] on 7/16 for bronchitis. -Unable to reach [facility] today   -Left a voicemail message requesting a return phone call back to this AC when patient is able     Care Coordination Plan of Care: This nurse Care Coordinator will await call back from patient, and if no return call; will attempt to reach patient back again later today.

## 2020-10-29 RX ORDER — NORGESTIMATE AND ETHINYL ESTRADIOL 0.25-0.035
1 KIT ORAL DAILY
Qty: 1 PACKET | Refills: 3 | Status: SHIPPED | OUTPATIENT
Start: 2020-10-29 | End: 2021-02-02 | Stop reason: SDUPTHER

## 2021-02-02 ENCOUNTER — HOSPITAL ENCOUNTER (OUTPATIENT)
Age: 36
Setting detail: SPECIMEN
Discharge: HOME | End: 2021-02-02
Payer: COMMERCIAL

## 2021-02-02 ENCOUNTER — OFFICE VISIT (OUTPATIENT)
Dept: OBGYN | Age: 36
End: 2021-02-02
Payer: COMMERCIAL

## 2021-02-02 VITALS
DIASTOLIC BLOOD PRESSURE: 86 MMHG | BODY MASS INDEX: 20.25 KG/M2 | HEIGHT: 67 IN | WEIGHT: 129 LBS | SYSTOLIC BLOOD PRESSURE: 140 MMHG

## 2021-02-02 DIAGNOSIS — Z11.51 SCREENING FOR HPV (HUMAN PAPILLOMAVIRUS): ICD-10-CM

## 2021-02-02 DIAGNOSIS — Z01.419 WELL WOMAN EXAM WITH ROUTINE GYNECOLOGICAL EXAM: ICD-10-CM

## 2021-02-02 DIAGNOSIS — N92.0 MENORRHAGIA WITH REGULAR CYCLE: ICD-10-CM

## 2021-02-02 DIAGNOSIS — Z01.419 WELL WOMAN EXAM WITH ROUTINE GYNECOLOGICAL EXAM: Primary | ICD-10-CM

## 2021-02-02 PROCEDURE — G8484 FLU IMMUNIZE NO ADMIN: HCPCS | Performed by: ADVANCED PRACTICE MIDWIFE

## 2021-02-02 PROCEDURE — 99395 PREV VISIT EST AGE 18-39: CPT | Performed by: ADVANCED PRACTICE MIDWIFE

## 2021-02-02 PROCEDURE — 87624 HPV HI-RISK TYP POOLED RSLT: CPT

## 2021-02-02 PROCEDURE — G0145 SCR C/V CYTO,THINLAYER,RESCR: HCPCS

## 2021-02-02 RX ORDER — NORGESTIMATE AND ETHINYL ESTRADIOL 0.25-0.035
1 KIT ORAL DAILY
Qty: 3 PACKET | Refills: 3 | Status: SHIPPED | OUTPATIENT
Start: 2021-02-02 | End: 2022-01-31

## 2021-02-02 NOTE — PROGRESS NOTES
YEARLY PHYSICAL    Date of service: 2021    Reese Estes  Is a 38 y.o.   female    PT's PCP is: Cheikh Bucio MD     : 1985                                             Subjective:       Patient's last menstrual period was 2021 (approximate). Are your menses regular: yes    OB History    Para Term  AB Living   7 4 4   3 4   SAB TAB Ectopic Molar Multiple Live Births   3       0 4      # Outcome Date GA Lbr Javier/2nd Weight Sex Delivery Anes PTL Lv   7 SAB 20 6w0d    SAB      6 SAB 19 4w0d    SAB      5 Term 18 38w4d / 00:12 6 lb 9.4 oz (2.988 kg) F Vag-Spont EPI N RATNA   4 Term 16 39w0d  7 lb 0.2 oz (3.181 kg) M Vag-Spont   RATAN   3 Term 10/29/09 40w0d  7 lb 4 oz (3.289 kg) F Vag-Spont None N RATNA   2 SAB 2008 6w0d    SAB      1 Term 10/23/03 40w0d  7 lb 7 oz (3.374 kg) F Vag-Spont None N RATNA        Social History     Tobacco Use   Smoking Status Current Every Day Smoker    Packs/day: 0.25    Types: Cigarettes   Smokeless Tobacco Never Used   Tobacco Comment    trying to quit on own        Social History     Substance and Sexual Activity   Alcohol Use Yes    Alcohol/week: 0.0 standard drinks    Comment: rarely       Family History   Problem Relation Age of Onset    Asthma Mother     Diabetes Mother         IDDM    Hypertension Mother     Stroke Mother     No Known Problems Father     Cancer Maternal Grandmother         Breast    Diabetes Maternal Grandmother         IDDM    Cancer Paternal Grandmother         Breast    Heart Failure Paternal Grandmother        Any family history of breast or ovarian cancer: No    Any family history of blood clots: No      Allergies: Patient has no known allergies.       Current Outpatient Medications:     norgestimate-ethinyl estradiol (ORTHO-CYCLEN, 28,) 0.25-35 MG-MCG per tablet, Take 1 tablet by mouth daily, Disp: 3 packet, Rfl: 3   Paternal Grandmother         Breast    Heart Failure Paternal Grandmother        Chief Complaint   [facility]   [facility]     Yearly-PAP. last pap 03/01/16. pt states no issues or concerns. pt declines std testing     Medication Refill     Ortho cyclen           PE:  Vital Signs  Blood pressure (!) 140/86, height 5' 7\" (1.702 m), weight 129 lb (58.5 kg), last menstrual period 01/24/2021, not currently breastfeeding. Estimated body mass index is 20.2 kg/m² as calculated from the following:    Height as of this encounter: 5' 7\" (1.702 m). Weight as of this encounter: 129 lb (58.5 kg). Labs:    No results found for this visit on 02/02/21. NURSE: RAFI    HPI: Patient presents for an annual well woman exam and reports no concerns. She states she had her first covid vaccine yesterday and is not feeling well today. She declines breast concerns. She declines bowel or bladder changes. She declines vaginal discharge, odor, and irritation. She is sexually active and declines dyspareunia and bleeding with sex. She is on OCP for cycle control and likes this. She reports this is the only thing that has been able to control her periods. She states her periods are regular and only heavy on the first day. Her last pap was in 2016. She declines any history of abnormal pap smears. Review of Systems   Constitutional: Positive for fatigue (from Covid vaccine). All other systems reviewed and are negative. Objective  Lymphatic:   no lymphadenopathy  Heent:   negative   Cor: regular rate and rhythm, no murmurs              Pul:clear to auscultation bilaterally- no wheezes, rales or rhonchi, normal air movement, no respiratory distress      GI: Abdomen soft, non-tender.  BS normal. No masses,  No organomegaly           Extremities: normal strength, tone, and muscle mass, no deformities, ROM of all joints is normal, no evidence of joint instability   Breasts: Breast:normal appearance, no masses or tenderness   Pelvic Exam: GENITAL/URINARY:  External Genitalia:  General appearance; normal, Hair distribution; normal, Lesions absent  Vagina:  General appearance normal, Estrogen effect normal, Discharge absent, Lesions absent, Pelvic support normal  Cervix:  General appearance normal, Lesions absent, Discharge absent, Tenderness absent, Enlargement absent, Nodularity absent  Uterus:  Size normal, Contour normal, Position normal, Masses absent, Consistency; normal, Support normal, Tenderness absent  Adenexa: Masses absent, Tenderness absent, Enlargement absent, Nodularity absent                                    Vaginal discharge: no vaginal discharge                             Assessment and Plan          Diagnosis Orders   1. Well woman exam with routine gynecological exam  PAP SMEAR   2. Screening for HPV (human papillomavirus)  PAP SMEAR   3. Menorrhagia with regular cycle  norgestimate-ethinyl estradiol (ORTHO-CYCLEN, 28,) 0.25-35 MG-MCG per tablet     1. Discussed breast self awareness and self breast exams. 2. Discussed need for a pap smear according to ASCCP guidelines - pap smear collected. 3. OCP refill sent to patient's pharmacy. I am having Gerhardt Hickory maintain her Prenatal Plus/Iron, Cyanocobalamin (B-12 IJ), albuterol sulfate HFA, and norgestimate-ethinyl estradiol. Return in about 1 year (around 2/2/2022) for yearly. She was also counseled on her preventative health maintenance recommendations and follow-up. There are no Patient Instructions on file for this visit.     Shruti Cespedes,2/2/2021 1:53 PM

## 2021-02-04 LAB
HPV SAMPLE: ABNORMAL
HPV, GENOTYPE 16: NOT DETECTED
HPV, GENOTYPE 18: NOT DETECTED
HPV, HIGH RISK OTHER: DETECTED
HPV, INTERPRETATION: ABNORMAL
SPECIMEN DESCRIPTION: ABNORMAL

## 2021-02-11 LAB — CYTOLOGY REPORT: NORMAL

## 2021-03-04 PROBLEM — Z01.419 WELL WOMAN EXAM WITH ROUTINE GYNECOLOGICAL EXAM: Status: RESOLVED | Noted: 2021-02-02 | Resolved: 2021-03-04

## 2022-08-22 NOTE — PROGRESS NOTES
Patient up to BR. Denies dizziness. Discomfort present, but does no request medicinal intervention.  Scant amt of sangious vaginal drainae scheduled

## 2023-10-25 ENCOUNTER — HOSPITAL ENCOUNTER (OUTPATIENT)
Age: 38
Setting detail: SPECIMEN
Discharge: HOME | End: 2023-10-25

## 2023-10-25 ENCOUNTER — ANCILLARY PROCEDURE (OUTPATIENT)
Dept: OBGYN | Age: 38
End: 2023-10-25
Payer: MEDICAID

## 2023-10-25 ENCOUNTER — OFFICE VISIT (OUTPATIENT)
Dept: OBGYN | Age: 38
End: 2023-10-25

## 2023-10-25 VITALS
HEIGHT: 67 IN | BODY MASS INDEX: 21.22 KG/M2 | SYSTOLIC BLOOD PRESSURE: 116 MMHG | DIASTOLIC BLOOD PRESSURE: 74 MMHG | WEIGHT: 135.2 LBS

## 2023-10-25 DIAGNOSIS — N91.2 AMENORRHEA: ICD-10-CM

## 2023-10-25 DIAGNOSIS — O36.80X0 ENCOUNTER TO DETERMINE FETAL VIABILITY OF PREGNANCY, SINGLE OR UNSPECIFIED FETUS: ICD-10-CM

## 2023-10-25 DIAGNOSIS — O20.0 THREATENED ABORTION: Primary | ICD-10-CM

## 2023-10-25 DIAGNOSIS — Z32.01 POSITIVE PREGNANCY TEST: ICD-10-CM

## 2023-10-25 LAB
B-HCG SERPL EIA 3RD IS-ACNC: ABNORMAL MIU/ML
BASOPHILS # BLD: 0.06 K/UL (ref 0–0.2)
BASOPHILS NFR BLD: 1 % (ref 0–2)
EOSINOPHIL # BLD: 0.16 K/UL (ref 0–0.44)
EOSINOPHILS RELATIVE PERCENT: 2 % (ref 1–4)
ERYTHROCYTE [DISTWIDTH] IN BLOOD BY AUTOMATED COUNT: 19.6 % (ref 11.8–14.4)
HCT VFR BLD AUTO: 32.4 % (ref 36.3–47.1)
HGB BLD-MCNC: 10.1 G/DL (ref 11.9–15.1)
IMM GRANULOCYTES # BLD AUTO: 0.06 K/UL (ref 0–0.3)
IMM GRANULOCYTES NFR BLD: 1 %
LYMPHOCYTES NFR BLD: 1.48 K/UL (ref 1.1–3.7)
LYMPHOCYTES RELATIVE PERCENT: 20 % (ref 24–43)
MCH RBC QN AUTO: 25.3 PG (ref 25.2–33.5)
MCHC RBC AUTO-ENTMCNC: 31.2 G/DL (ref 28.4–34.8)
MCV RBC AUTO: 81.2 FL (ref 82.6–102.9)
MONOCYTES NFR BLD: 0.72 K/UL (ref 0.1–1.2)
MONOCYTES NFR BLD: 10 % (ref 3–12)
NEUTROPHILS NFR BLD: 66 % (ref 36–65)
NEUTS SEG NFR BLD: 4.9 K/UL (ref 1.5–8.1)
NRBC BLD-RTO: 0 PER 100 WBC
PLATELET # BLD AUTO: 319 K/UL (ref 138–453)
PMV BLD AUTO: 10.7 FL (ref 8.1–13.5)
RBC # BLD AUTO: 3.99 M/UL (ref 3.95–5.11)
WBC OTHER # BLD: 7.4 K/UL (ref 3.5–11.3)

## 2023-10-25 PROCEDURE — 86901 BLOOD TYPING SEROLOGIC RH(D): CPT

## 2023-10-25 PROCEDURE — 86870 RBC ANTIBODY IDENTIFICATION: CPT

## 2023-10-25 PROCEDURE — 86880 COOMBS TEST DIRECT: CPT

## 2023-10-25 PROCEDURE — 86850 RBC ANTIBODY SCREEN: CPT

## 2023-10-25 PROCEDURE — 86900 BLOOD TYPING SEROLOGIC ABO: CPT

## 2023-10-25 PROCEDURE — 84702 CHORIONIC GONADOTROPIN TEST: CPT

## 2023-10-25 PROCEDURE — 85025 COMPLETE CBC W/AUTO DIFF WBC: CPT

## 2023-10-25 PROCEDURE — 76817 TRANSVAGINAL US OBSTETRIC: CPT | Performed by: OBSTETRICS & GYNECOLOGY

## 2023-10-25 PROCEDURE — 36415 COLL VENOUS BLD VENIPUNCTURE: CPT | Performed by: ADVANCED PRACTICE MIDWIFE

## 2023-10-25 NOTE — PROGRESS NOTES
Infection History    Blood Type      Patient or partner has Hepatitis C      Live with Someone with or Exposed to TB? No     History of STD/GC/Chlamydia/HPV/Syphilis? No     Patient or Partner has Hx of Genital Herpes? No     History of Chickenpox      History of MRSA      Risk of Toxoplasmosis      Risk of CMV      List of other infections      Rash or Viral Illness Since LMP? No     Additional comments      Patient or partner has Hepatitis B         Assessment:      Diagnosis Orders   1. Encounter for supervision of other normal pregnancy in first trimester        2.  Amenorrhea            Plan: Order Routine Prenatal Lab No: hcg quant, type and screen and cbc     Order additional testing if requested         Order Prenatal Vitamins   Yes             Appointment with Dia FLORES in 1 week for viability

## 2023-10-26 DIAGNOSIS — O20.0 THREATENED MISCARRIAGE: ICD-10-CM

## 2023-10-26 DIAGNOSIS — O03.9 SAB (SPONTANEOUS ABORTION): Primary | ICD-10-CM

## 2023-10-26 LAB
ABO + RH BLD: NORMAL
ANTIBODY IDENTIFICATION: NORMAL
BLOOD GROUP ANTIBODIES SERPL: POSITIVE
DAT POLY-SP REAG RBC QL: NEGATIVE

## 2023-10-27 ENCOUNTER — HOSPITAL ENCOUNTER (OUTPATIENT)
Age: 38
Discharge: HOME | End: 2023-10-27

## 2023-10-27 DIAGNOSIS — O20.0 THREATENED MISCARRIAGE: ICD-10-CM

## 2023-10-27 LAB — B-HCG SERPL EIA 3RD IS-ACNC: ABNORMAL MIU/ML

## 2023-10-27 PROCEDURE — 84702 CHORIONIC GONADOTROPIN TEST: CPT

## 2023-10-27 PROCEDURE — 36415 COLL VENOUS BLD VENIPUNCTURE: CPT

## 2023-10-31 ENCOUNTER — ANCILLARY PROCEDURE (OUTPATIENT)
Dept: OBGYN | Age: 38
End: 2023-10-31

## 2023-10-31 ENCOUNTER — OFFICE VISIT (OUTPATIENT)
Dept: OBGYN | Age: 38
End: 2023-10-31

## 2023-10-31 VITALS
BODY MASS INDEX: 21.5 KG/M2 | DIASTOLIC BLOOD PRESSURE: 74 MMHG | WEIGHT: 137 LBS | HEIGHT: 67 IN | SYSTOLIC BLOOD PRESSURE: 112 MMHG

## 2023-10-31 DIAGNOSIS — O20.0 THREATENED ABORTION: ICD-10-CM

## 2023-10-31 DIAGNOSIS — O03.9 SPONTANEOUS ABORTION: Primary | ICD-10-CM

## 2023-10-31 PROCEDURE — 36415 COLL VENOUS BLD VENIPUNCTURE: CPT | Performed by: OBSTETRICS & GYNECOLOGY

## 2023-10-31 PROCEDURE — 99213 OFFICE O/P EST LOW 20 MIN: CPT | Performed by: OBSTETRICS & GYNECOLOGY

## 2023-10-31 PROCEDURE — 76817 TRANSVAGINAL US OBSTETRIC: CPT | Performed by: OBSTETRICS & GYNECOLOGY

## 2023-10-31 RX ORDER — MISOPROSTOL 200 UG/1
600 TABLET ORAL ONCE
Qty: 3 TABLET | Refills: 0 | Status: SHIPPED | OUTPATIENT
Start: 2023-10-31 | End: 2023-11-02

## 2023-10-31 RX ORDER — KETOROLAC TROMETHAMINE 10 MG/1
10 TABLET, FILM COATED ORAL EVERY 6 HOURS PRN
Qty: 12 TABLET | Refills: 0 | Status: SHIPPED | OUTPATIENT
Start: 2023-10-31

## 2023-11-02 ENCOUNTER — OFFICE VISIT (OUTPATIENT)
Dept: OBGYN | Age: 38
End: 2023-11-02

## 2023-11-02 ENCOUNTER — PATIENT MESSAGE (OUTPATIENT)
Dept: OBGYN | Age: 38
End: 2023-11-02

## 2023-11-02 ENCOUNTER — HOSPITAL ENCOUNTER (OUTPATIENT)
Age: 38
Setting detail: SPECIMEN
Discharge: HOME | End: 2023-11-02

## 2023-11-02 VITALS
SYSTOLIC BLOOD PRESSURE: 118 MMHG | HEIGHT: 67 IN | BODY MASS INDEX: 21.31 KG/M2 | DIASTOLIC BLOOD PRESSURE: 76 MMHG | WEIGHT: 135.8 LBS

## 2023-11-02 DIAGNOSIS — O36.1910 KELL ISOIMMUNIZATION DURING PREGNANCY IN FIRST TRIMESTER, SINGLE OR UNSPECIFIED FETUS: ICD-10-CM

## 2023-11-02 DIAGNOSIS — O03.9: Primary | ICD-10-CM

## 2023-11-02 DIAGNOSIS — O03.9: ICD-10-CM

## 2023-11-02 DIAGNOSIS — D53.1 MEGALOBLASTIC ANEMIA DUE TO VITAMIN B12 DEFICIENCY: ICD-10-CM

## 2023-11-02 PROBLEM — R53.83 FATIGUE: Status: ACTIVE | Noted: 2023-11-02

## 2023-11-02 PROBLEM — R74.8 ELEVATED LIVER ENZYMES: Status: ACTIVE | Noted: 2023-11-02

## 2023-11-02 PROBLEM — E55.9 VITAMIN D DEFICIENCY: Status: ACTIVE | Noted: 2023-11-02

## 2023-11-02 LAB
B-HCG SERPL EIA 3RD IS-ACNC: 1827 MIU/ML
BASOPHILS # BLD: 0.08 K/UL (ref 0–0.2)
BASOPHILS NFR BLD: 1 % (ref 0–2)
EOSINOPHIL # BLD: 0.18 K/UL (ref 0–0.44)
EOSINOPHILS RELATIVE PERCENT: 2 % (ref 1–4)
ERYTHROCYTE [DISTWIDTH] IN BLOOD BY AUTOMATED COUNT: 19.9 % (ref 11.8–14.4)
FERRITIN SERPL-MCNC: 11 NG/ML (ref 13–150)
HCT VFR BLD AUTO: 27.3 % (ref 36.3–47.1)
HGB BLD-MCNC: 8.5 G/DL (ref 11.9–15.1)
IMM GRANULOCYTES # BLD AUTO: 0.04 K/UL (ref 0–0.3)
IMM GRANULOCYTES NFR BLD: 1 %
IRON SATN MFR SERPL: 4 % (ref 20–55)
IRON SERPL-MCNC: 13 UG/DL (ref 37–145)
LYMPHOCYTES NFR BLD: 2.13 K/UL (ref 1.1–3.7)
LYMPHOCYTES RELATIVE PERCENT: 27 % (ref 24–43)
MCH RBC QN AUTO: 25.1 PG (ref 25.2–33.5)
MCHC RBC AUTO-ENTMCNC: 31.1 G/DL (ref 28.4–34.8)
MCV RBC AUTO: 80.8 FL (ref 82.6–102.9)
MONOCYTES NFR BLD: 0.72 K/UL (ref 0.1–1.2)
MONOCYTES NFR BLD: 9 % (ref 3–12)
NEUTROPHILS NFR BLD: 60 % (ref 36–65)
NEUTS SEG NFR BLD: 4.7 K/UL (ref 1.5–8.1)
NRBC BLD-RTO: 0 PER 100 WBC
PLATELET # BLD AUTO: 251 K/UL (ref 138–453)
PMV BLD AUTO: 9.7 FL (ref 8.1–13.5)
RBC # BLD AUTO: 3.38 M/UL (ref 3.95–5.11)
TIBC SERPL-MCNC: 362 UG/DL (ref 250–450)
UNSATURATED IRON BINDING CAPACITY: 349 UG/DL (ref 112–347)
WBC OTHER # BLD: 7.9 K/UL (ref 3.5–11.3)

## 2023-11-02 PROCEDURE — 83550 IRON BINDING TEST: CPT

## 2023-11-02 PROCEDURE — 36415 COLL VENOUS BLD VENIPUNCTURE: CPT | Performed by: ADVANCED PRACTICE MIDWIFE

## 2023-11-02 PROCEDURE — 85025 COMPLETE CBC W/AUTO DIFF WBC: CPT

## 2023-11-02 PROCEDURE — 82728 ASSAY OF FERRITIN: CPT

## 2023-11-02 PROCEDURE — 84702 CHORIONIC GONADOTROPIN TEST: CPT

## 2023-11-02 PROCEDURE — 83540 ASSAY OF IRON: CPT

## 2023-11-02 RX ORDER — MISOPROSTOL 100 UG/1
100 TABLET ORAL 3 TIMES DAILY
Qty: 15 TABLET | Refills: 0 | Status: SHIPPED | OUTPATIENT
Start: 2023-11-02 | End: 2023-11-07

## 2023-11-02 RX ORDER — METHYLERGONOVINE MALEATE 0.2 MG/1
0.2 TABLET ORAL 3 TIMES DAILY
Qty: 15 TABLET | Refills: 0 | Status: SHIPPED | OUTPATIENT
Start: 2023-11-02 | End: 2023-11-07

## 2023-11-02 ASSESSMENT — PATIENT HEALTH QUESTIONNAIRE - PHQ9
2. FEELING DOWN, DEPRESSED OR HOPELESS: 1
SUM OF ALL RESPONSES TO PHQ QUESTIONS 1-9: 2
SUM OF ALL RESPONSES TO PHQ9 QUESTIONS 1 & 2: 2
1. LITTLE INTEREST OR PLEASURE IN DOING THINGS: 1

## 2023-11-03 ENCOUNTER — TELEPHONE (OUTPATIENT)
Dept: OBGYN | Age: 38
End: 2023-11-03

## 2023-11-03 NOTE — TELEPHONE ENCOUNTER
Pharmacy called to verify cytotec dosage, contacted Harish Poon and verification was correct and given to pharmacy

## 2023-11-03 NOTE — TELEPHONE ENCOUNTER
From: CYNTHIA Morales CNM  To: Maurice Marin  Sent: 11/2/2023 7:01 PM EDT  Subject: medication    Please let me know what the pharmacy said

## 2023-11-06 ENCOUNTER — OFFICE VISIT (OUTPATIENT)
Dept: OBGYN | Age: 38
End: 2023-11-06
Payer: MEDICAID

## 2023-11-06 VITALS
BODY MASS INDEX: 21.03 KG/M2 | SYSTOLIC BLOOD PRESSURE: 124 MMHG | DIASTOLIC BLOOD PRESSURE: 74 MMHG | HEIGHT: 67 IN | WEIGHT: 134 LBS

## 2023-11-06 DIAGNOSIS — O03.9: Primary | ICD-10-CM

## 2023-11-06 PROCEDURE — G8420 CALC BMI NORM PARAMETERS: HCPCS | Performed by: ADVANCED PRACTICE MIDWIFE

## 2023-11-06 PROCEDURE — G8427 DOCREV CUR MEDS BY ELIG CLIN: HCPCS | Performed by: ADVANCED PRACTICE MIDWIFE

## 2023-11-06 PROCEDURE — 4004F PT TOBACCO SCREEN RCVD TLK: CPT | Performed by: ADVANCED PRACTICE MIDWIFE

## 2023-11-06 PROCEDURE — 99212 OFFICE O/P EST SF 10 MIN: CPT | Performed by: ADVANCED PRACTICE MIDWIFE

## 2023-11-06 PROCEDURE — G8484 FLU IMMUNIZE NO ADMIN: HCPCS | Performed by: ADVANCED PRACTICE MIDWIFE

## 2023-11-08 ENCOUNTER — OFFICE VISIT (OUTPATIENT)
Dept: ONCOLOGY | Age: 38
End: 2023-11-08

## 2023-11-08 VITALS
SYSTOLIC BLOOD PRESSURE: 129 MMHG | RESPIRATION RATE: 18 BRPM | HEART RATE: 109 BPM | DIASTOLIC BLOOD PRESSURE: 79 MMHG | HEIGHT: 67 IN | WEIGHT: 134 LBS | TEMPERATURE: 97.6 F | BODY MASS INDEX: 21.03 KG/M2

## 2023-11-08 DIAGNOSIS — D64.9 SYMPTOMATIC ANEMIA: ICD-10-CM

## 2023-11-08 DIAGNOSIS — D50.8 IRON DEFICIENCY ANEMIA SECONDARY TO INADEQUATE DIETARY IRON INTAKE: Primary | ICD-10-CM

## 2023-11-08 DIAGNOSIS — D50.8 IRON DEFICIENCY ANEMIA SECONDARY TO INADEQUATE DIETARY IRON INTAKE: ICD-10-CM

## 2023-11-08 DIAGNOSIS — K90.9 IRON MALABSORPTION: ICD-10-CM

## 2023-11-08 DIAGNOSIS — D50.9 MICROCYTIC ANEMIA: Primary | ICD-10-CM

## 2023-11-08 DIAGNOSIS — D64.9 ANEMIA, UNSPECIFIED TYPE: ICD-10-CM

## 2023-11-08 RX ORDER — DIPHENHYDRAMINE HYDROCHLORIDE 50 MG/ML
50 INJECTION INTRAMUSCULAR; INTRAVENOUS
OUTPATIENT
Start: 2023-11-08

## 2023-11-08 RX ORDER — ACETAMINOPHEN 325 MG/1
650 TABLET ORAL
OUTPATIENT
Start: 2023-11-08

## 2023-11-08 RX ORDER — SODIUM CHLORIDE 9 MG/ML
5-250 INJECTION, SOLUTION INTRAVENOUS PRN
OUTPATIENT
Start: 2023-11-08

## 2023-11-08 RX ORDER — ONDANSETRON 2 MG/ML
8 INJECTION INTRAMUSCULAR; INTRAVENOUS
OUTPATIENT
Start: 2023-11-08

## 2023-11-08 RX ORDER — EPINEPHRINE 1 MG/ML
0.3 INJECTION, SOLUTION, CONCENTRATE INTRAVENOUS PRN
OUTPATIENT
Start: 2023-11-08

## 2023-11-08 RX ORDER — SODIUM CHLORIDE 9 MG/ML
INJECTION, SOLUTION INTRAVENOUS CONTINUOUS
OUTPATIENT
Start: 2023-11-08

## 2023-11-08 RX ORDER — SODIUM CHLORIDE 0.9 % (FLUSH) 0.9 %
5-40 SYRINGE (ML) INJECTION PRN
OUTPATIENT
Start: 2023-11-08

## 2023-11-08 RX ORDER — FAMOTIDINE 10 MG/ML
20 INJECTION, SOLUTION INTRAVENOUS
OUTPATIENT
Start: 2023-11-08

## 2023-11-08 RX ORDER — HEPARIN SODIUM (PORCINE) LOCK FLUSH IV SOLN 100 UNIT/ML 100 UNIT/ML
500 SOLUTION INTRAVENOUS PRN
OUTPATIENT
Start: 2023-11-08

## 2023-11-08 RX ORDER — ALBUTEROL SULFATE 90 UG/1
4 AEROSOL, METERED RESPIRATORY (INHALATION) PRN
OUTPATIENT
Start: 2023-11-08

## 2023-11-08 NOTE — PROGRESS NOTES
_           Ms. Nupur Amin is a very pleasant 34 y.o. female with history of multiple co morbidities as listed. Patient is referred for evaluation and further management of anemia. The patient states she had past history of megaloblastic anemia. She was evaluated in [facility] in the past and she had bone marrow test and further hematologic work-up. No clear underlying etiology. She has been maintained on vitamin B12 replacement until about 4 months ago. She received iron infusion in the past with the last treatment 2 years ago. Patient is quite symptomatic. She has increasing weakness and fatigue. She has shortness of breath on exertion. She has muscle cramps. She is craving ice. Patient denies any active bleeding. No melena or hematochezia. No hematemesis. Patient had recent miscarriage after 10 weeks of gestation. She had multiple miscarriages. She had 4 living children. Patient is a smoker. Trying to cut down. Denies alcohol drinking. PAST MEDICAL HISTORY: has a past medical history of H/O echocardiogram, History of Holter monitoring, Hyperthyroidism affecting pregnancy in third trimester, and Pernicious anemia. PAST SURGICAL HISTORY: has a past surgical history that includes Dilation and curettage of uterus (01/08/2020) and Dilation and curettage of uterus (N/A, 1/8/2020). CURRENT MEDICATIONS:  has a current medication list which includes the following prescription(s): cyanocobalamin. ALLERGIES:  is allergic to sulfamethoxazole-trimethoprim. FAMILY HISTORY: Negative for any hematological or oncological conditions. SOCIAL HISTORY:  reports that she has been smoking cigarettes. She has been smoking an average of .25 packs per day. She has never used smokeless tobacco. She reports that she does not currently use alcohol. She reports that she does not use drugs.     REVIEW OF

## 2023-12-12 ENCOUNTER — TELEPHONE (OUTPATIENT)
Dept: ONCOLOGY | Age: 38
End: 2023-12-12

## 2023-12-12 NOTE — TELEPHONE ENCOUNTER
Call again to [facility] to get updated insurance information so we may obtain prior authorization for IV iron as ordered in November. She stated at time of MD visit that her insurance was changing. Writer called 12/6/23 and again today requesting she contact us with updated insurance information so we can get infusions approved and scheduled. Provided return phone number and await patient to call us with update.

## 2024-01-04 RX ORDER — ONDANSETRON 2 MG/ML
8 INJECTION INTRAMUSCULAR; INTRAVENOUS
OUTPATIENT
Start: 2024-01-04

## 2024-01-04 RX ORDER — SODIUM CHLORIDE 0.9 % (FLUSH) 0.9 %
5-40 SYRINGE (ML) INJECTION PRN
OUTPATIENT
Start: 2024-01-04

## 2024-01-04 RX ORDER — SODIUM CHLORIDE 9 MG/ML
5-250 INJECTION, SOLUTION INTRAVENOUS PRN
OUTPATIENT
Start: 2024-01-04

## 2024-01-04 RX ORDER — EPINEPHRINE 1 MG/ML
0.3 INJECTION, SOLUTION, CONCENTRATE INTRAVENOUS PRN
OUTPATIENT
Start: 2024-01-04

## 2024-01-04 RX ORDER — HEPARIN 100 UNIT/ML
500 SYRINGE INTRAVENOUS PRN
OUTPATIENT
Start: 2024-01-04

## 2024-01-04 RX ORDER — SODIUM CHLORIDE 9 MG/ML
INJECTION, SOLUTION INTRAVENOUS CONTINUOUS
OUTPATIENT
Start: 2024-01-04

## 2024-01-04 RX ORDER — DIPHENHYDRAMINE HYDROCHLORIDE 50 MG/ML
50 INJECTION INTRAMUSCULAR; INTRAVENOUS
OUTPATIENT
Start: 2024-01-04

## 2024-01-04 RX ORDER — ACETAMINOPHEN 325 MG/1
650 TABLET ORAL
OUTPATIENT
Start: 2024-01-04

## 2024-01-09 ENCOUNTER — HOSPITAL ENCOUNTER (OUTPATIENT)
Dept: INFUSION THERAPY | Age: 39
Discharge: HOME | End: 2024-01-09
Payer: MEDICAID

## 2024-01-09 VITALS
SYSTOLIC BLOOD PRESSURE: 118 MMHG | TEMPERATURE: 97.3 F | DIASTOLIC BLOOD PRESSURE: 75 MMHG | HEART RATE: 88 BPM | RESPIRATION RATE: 18 BRPM

## 2024-01-09 DIAGNOSIS — K90.9 IRON MALABSORPTION: ICD-10-CM

## 2024-01-09 DIAGNOSIS — D50.8 IRON DEFICIENCY ANEMIA SECONDARY TO INADEQUATE DIETARY IRON INTAKE: Primary | ICD-10-CM

## 2024-01-09 PROCEDURE — 2580000003 HC RX 258: Performed by: INTERNAL MEDICINE

## 2024-01-09 PROCEDURE — 6360000002 HC RX W HCPCS: Performed by: INTERNAL MEDICINE

## 2024-01-09 PROCEDURE — 96365 THER/PROPH/DIAG IV INF INIT: CPT

## 2024-01-09 RX ORDER — ACETAMINOPHEN 325 MG/1
650 TABLET ORAL
Status: CANCELLED | OUTPATIENT
Start: 2024-01-16

## 2024-01-09 RX ORDER — EPINEPHRINE 1 MG/ML
0.3 INJECTION, SOLUTION, CONCENTRATE INTRAVENOUS PRN
Status: CANCELLED | OUTPATIENT
Start: 2024-01-16

## 2024-01-09 RX ORDER — SODIUM CHLORIDE 9 MG/ML
5-250 INJECTION, SOLUTION INTRAVENOUS PRN
Status: CANCELLED | OUTPATIENT
Start: 2024-01-16

## 2024-01-09 RX ORDER — ONDANSETRON 2 MG/ML
8 INJECTION INTRAMUSCULAR; INTRAVENOUS
Status: CANCELLED | OUTPATIENT
Start: 2024-01-16

## 2024-01-09 RX ORDER — HEPARIN 100 UNIT/ML
500 SYRINGE INTRAVENOUS PRN
Status: CANCELLED | OUTPATIENT
Start: 2024-01-16

## 2024-01-09 RX ORDER — SODIUM CHLORIDE 0.9 % (FLUSH) 0.9 %
5-40 SYRINGE (ML) INJECTION PRN
Status: CANCELLED | OUTPATIENT
Start: 2024-01-16

## 2024-01-09 RX ORDER — SODIUM CHLORIDE 0.9 % (FLUSH) 0.9 %
5-40 SYRINGE (ML) INJECTION PRN
Status: DISCONTINUED | OUTPATIENT
Start: 2024-01-09 | End: 2024-01-10 | Stop reason: HOSPADM

## 2024-01-09 RX ORDER — SODIUM CHLORIDE 9 MG/ML
INJECTION, SOLUTION INTRAVENOUS CONTINUOUS
Status: CANCELLED | OUTPATIENT
Start: 2024-01-16

## 2024-01-09 RX ORDER — DIPHENHYDRAMINE HYDROCHLORIDE 50 MG/ML
50 INJECTION INTRAMUSCULAR; INTRAVENOUS
Status: CANCELLED | OUTPATIENT
Start: 2024-01-16

## 2024-01-09 RX ORDER — FAMOTIDINE 10 MG/ML
20 INJECTION, SOLUTION INTRAVENOUS
Status: CANCELLED | OUTPATIENT
Start: 2024-01-16

## 2024-01-09 RX ORDER — SODIUM CHLORIDE 9 MG/ML
5-250 INJECTION, SOLUTION INTRAVENOUS PRN
Status: DISCONTINUED | OUTPATIENT
Start: 2024-01-09 | End: 2024-01-10 | Stop reason: HOSPADM

## 2024-01-09 RX ADMIN — FERRIC CARBOXYMALTOSE INJECTION 750 MG: 50 INJECTION, SOLUTION INTRAVENOUS at 13:35

## 2024-01-09 RX ADMIN — SODIUM CHLORIDE 100 ML/HR: 9 INJECTION, SOLUTION INTRAVENOUS at 13:33

## 2024-01-09 NOTE — PLAN OF CARE
Problem: Safety - Adult  Goal: Absence of infection signs and symptoms  Outcome: Adequate for Discharge

## 2024-01-16 ENCOUNTER — HOSPITAL ENCOUNTER (OUTPATIENT)
Dept: INFUSION THERAPY | Age: 39
Discharge: HOME | End: 2024-01-16
Payer: MEDICAID

## 2024-01-16 VITALS
SYSTOLIC BLOOD PRESSURE: 112 MMHG | RESPIRATION RATE: 18 BRPM | HEART RATE: 98 BPM | DIASTOLIC BLOOD PRESSURE: 73 MMHG | TEMPERATURE: 97.6 F

## 2024-01-16 DIAGNOSIS — K90.9 IRON MALABSORPTION: ICD-10-CM

## 2024-01-16 DIAGNOSIS — D50.8 IRON DEFICIENCY ANEMIA SECONDARY TO INADEQUATE DIETARY IRON INTAKE: Primary | ICD-10-CM

## 2024-01-16 PROCEDURE — 6360000002 HC RX W HCPCS: Performed by: INTERNAL MEDICINE

## 2024-01-16 PROCEDURE — 96365 THER/PROPH/DIAG IV INF INIT: CPT

## 2024-01-16 PROCEDURE — 2580000003 HC RX 258: Performed by: INTERNAL MEDICINE

## 2024-01-16 RX ORDER — SODIUM CHLORIDE 9 MG/ML
INJECTION, SOLUTION INTRAVENOUS CONTINUOUS
Status: CANCELLED | OUTPATIENT
Start: 2024-01-23

## 2024-01-16 RX ORDER — SODIUM CHLORIDE 0.9 % (FLUSH) 0.9 %
5-40 SYRINGE (ML) INJECTION PRN
Status: CANCELLED | OUTPATIENT
Start: 2024-01-23

## 2024-01-16 RX ORDER — SODIUM CHLORIDE 9 MG/ML
5-250 INJECTION, SOLUTION INTRAVENOUS PRN
Status: CANCELLED | OUTPATIENT
Start: 2024-01-23

## 2024-01-16 RX ORDER — FAMOTIDINE 10 MG/ML
20 INJECTION, SOLUTION INTRAVENOUS
Status: CANCELLED | OUTPATIENT
Start: 2024-01-23

## 2024-01-16 RX ORDER — SODIUM CHLORIDE 9 MG/ML
5-250 INJECTION, SOLUTION INTRAVENOUS PRN
Status: DISCONTINUED | OUTPATIENT
Start: 2024-01-16 | End: 2024-01-16

## 2024-01-16 RX ORDER — ACETAMINOPHEN 325 MG/1
650 TABLET ORAL
Status: CANCELLED | OUTPATIENT
Start: 2024-01-23

## 2024-01-16 RX ORDER — SODIUM CHLORIDE 0.9 % (FLUSH) 0.9 %
5-40 SYRINGE (ML) INJECTION PRN
Status: DISCONTINUED | OUTPATIENT
Start: 2024-01-16 | End: 2024-01-16

## 2024-01-16 RX ORDER — DIPHENHYDRAMINE HYDROCHLORIDE 50 MG/ML
50 INJECTION INTRAMUSCULAR; INTRAVENOUS
Status: CANCELLED | OUTPATIENT
Start: 2024-01-23

## 2024-01-16 RX ORDER — ONDANSETRON 2 MG/ML
8 INJECTION INTRAMUSCULAR; INTRAVENOUS
Status: CANCELLED | OUTPATIENT
Start: 2024-01-23

## 2024-01-16 RX ORDER — EPINEPHRINE 1 MG/ML
0.3 INJECTION, SOLUTION, CONCENTRATE INTRAVENOUS PRN
Status: CANCELLED | OUTPATIENT
Start: 2024-01-23

## 2024-01-16 RX ORDER — HEPARIN 100 UNIT/ML
500 SYRINGE INTRAVENOUS PRN
Status: CANCELLED | OUTPATIENT
Start: 2024-01-23

## 2024-01-16 RX ADMIN — SODIUM CHLORIDE, PRESERVATIVE FREE 10 ML: 5 INJECTION INTRAVENOUS at 13:06

## 2024-01-16 RX ADMIN — SODIUM CHLORIDE 200 ML/HR: 9 INJECTION, SOLUTION INTRAVENOUS at 13:06

## 2024-01-16 RX ADMIN — FERRIC CARBOXYMALTOSE INJECTION 750 MG: 50 INJECTION, SOLUTION INTRAVENOUS at 13:26

## 2024-02-27 ENCOUNTER — HOSPITAL ENCOUNTER (OUTPATIENT)
Age: 39
Discharge: HOME | End: 2024-02-27
Payer: MEDICAID

## 2024-02-27 DIAGNOSIS — D50.8 IRON DEFICIENCY ANEMIA SECONDARY TO INADEQUATE DIETARY IRON INTAKE: ICD-10-CM

## 2024-02-27 LAB
BASOPHILS # BLD: 0.06 K/UL (ref 0–0.2)
BASOPHILS NFR BLD: 1 % (ref 0–2)
EOSINOPHIL # BLD: 0.29 K/UL (ref 0–0.44)
EOSINOPHILS RELATIVE PERCENT: 5 % (ref 1–4)
FERRITIN SERPL-MCNC: 139 NG/ML (ref 13–150)
HCT VFR BLD AUTO: 36.5 % (ref 36.3–47.1)
HGB BLD-MCNC: 11.6 G/DL (ref 11.9–15.1)
IMM GRANULOCYTES # BLD AUTO: 0 K/UL (ref 0–0.3)
IMM GRANULOCYTES NFR BLD: 0 %
IRON SATN MFR SERPL: 32 % (ref 20–55)
IRON SERPL-MCNC: 71 UG/DL (ref 37–145)
LYMPHOCYTES NFR BLD: 1.48 K/UL (ref 1.1–3.7)
LYMPHOCYTES RELATIVE PERCENT: 26 % (ref 24–43)
MCH RBC QN AUTO: 31.8 PG (ref 25.2–33.5)
MCHC RBC AUTO-ENTMCNC: 31.8 G/DL (ref 28.4–34.8)
MCV RBC AUTO: 100 FL (ref 82.6–102.9)
MONOCYTES NFR BLD: 0.51 K/UL (ref 0.1–1.2)
MONOCYTES NFR BLD: 9 % (ref 3–12)
MORPHOLOGY: ABNORMAL
NEUTROPHILS NFR BLD: 59 % (ref 36–65)
NEUTS SEG NFR BLD: 3.36 K/UL (ref 1.5–8.1)
NRBC BLD-RTO: 0 PER 100 WBC
PLATELET # BLD AUTO: 228 K/UL (ref 138–453)
PMV BLD AUTO: 10.6 FL (ref 8.1–13.5)
RBC # BLD AUTO: 3.65 M/UL (ref 3.95–5.11)
TIBC SERPL-MCNC: 221 UG/DL (ref 250–450)
UNSATURATED IRON BINDING CAPACITY: 150 UG/DL (ref 112–347)
WBC OTHER # BLD: 5.7 K/UL (ref 3.5–11.3)

## 2024-02-27 PROCEDURE — 83540 ASSAY OF IRON: CPT

## 2024-02-27 PROCEDURE — 85025 COMPLETE CBC W/AUTO DIFF WBC: CPT

## 2024-02-27 PROCEDURE — 83550 IRON BINDING TEST: CPT

## 2024-02-27 PROCEDURE — 82728 ASSAY OF FERRITIN: CPT

## 2024-02-27 PROCEDURE — 82607 VITAMIN B-12: CPT

## 2024-02-27 PROCEDURE — 36415 COLL VENOUS BLD VENIPUNCTURE: CPT

## 2024-02-27 PROCEDURE — 82746 ASSAY OF FOLIC ACID SERUM: CPT

## 2024-02-28 LAB
FOLATE SERPL-MCNC: >20 NG/ML (ref 4.8–24.2)
VIT B12 SERPL-MCNC: <150 PG/ML (ref 232–1245)

## (undated) DEVICE — PEN: MARKING STD 100/CS: Brand: MEDICAL ACTION INDUSTRIES

## (undated) DEVICE — GLOVE ORANGE PI 8   MSG9080

## (undated) DEVICE — CATHETER URETH 16FR L16IN RED RUB INTMIT ROB MOD BARDX

## (undated) DEVICE — SOLUTION SURG PREP ANTIMICROBIAL 4 OZ SKIN WND EXIDINE

## (undated) DEVICE — COVER LT HNDL BLU PLAS

## (undated) DEVICE — Z DISCONTINUED BY MEDLINE USE 2711682 TRAY SKIN PREP DRY W/ PREM GLV

## (undated) DEVICE — TOWEL,OR,DSP,ST,BLUE,STD,4/PK,20PK/CS: Brand: MEDLINE

## (undated) DEVICE — GAUZE,SPONGE,4"X4",16PLY,XRAY,STRL,LF: Brand: MEDLINE

## (undated) DEVICE — Z DISCONTINUED (SUPPLIER OUT OF BUSINESS) CURETTE VAC DIA9MM CLR PLAS CVD RIG TEXT BND

## (undated) DEVICE — PAD,ABDOMINAL,8"X10",ST,LF: Brand: MEDLINE

## (undated) DEVICE — PACK,LITHOTOMY: Brand: MEDLINE

## (undated) DEVICE — PREP PAD BNS: Brand: CONVERTORS

## (undated) DEVICE — Device

## (undated) DEVICE — SOLUTION IV IRRIG POUR BRL 0.9% SODIUM CHL 2F7124

## (undated) DEVICE — CATHETER URETH 10FR L16IN SIL INTMIT TIEM TAPR COUDE TIP

## (undated) DEVICE — PAD N ADH W3XL4IN POLY COT SFT PERF FLM EASILY CUT ABSRB

## (undated) DEVICE — GOWN,AURORA,NON-REINFORCED,2XL: Brand: MEDLINE